# Patient Record
Sex: FEMALE | Race: WHITE | NOT HISPANIC OR LATINO | Employment: UNEMPLOYED | URBAN - METROPOLITAN AREA
[De-identification: names, ages, dates, MRNs, and addresses within clinical notes are randomized per-mention and may not be internally consistent; named-entity substitution may affect disease eponyms.]

---

## 2023-11-28 ENCOUNTER — HOSPITAL ENCOUNTER (OUTPATIENT)
Dept: GENERAL RADIOLOGY | Facility: HOSPITAL | Age: 53
Discharge: HOME | End: 2023-11-28
Admitting: INTERNAL MEDICINE
Payer: MEDICAID

## 2023-11-28 ENCOUNTER — OFFICE VISIT (OUTPATIENT)
Dept: INTERNAL MEDICINE | Facility: CLINIC | Age: 53
End: 2023-11-28
Payer: MEDICAID

## 2023-11-28 VITALS
SYSTOLIC BLOOD PRESSURE: 110 MMHG | OXYGEN SATURATION: 96 % | WEIGHT: 276.9 LBS | BODY MASS INDEX: 44.5 KG/M2 | HEART RATE: 74 BPM | HEIGHT: 66 IN | TEMPERATURE: 97.5 F | DIASTOLIC BLOOD PRESSURE: 76 MMHG

## 2023-11-28 DIAGNOSIS — M79.642 PAIN OF LEFT HAND: ICD-10-CM

## 2023-11-28 DIAGNOSIS — G89.4 CHRONIC PAIN SYNDROME: ICD-10-CM

## 2023-11-28 DIAGNOSIS — J44.89 ASTHMA-COPD OVERLAP SYNDROME: Primary | ICD-10-CM

## 2023-11-28 PROCEDURE — 1160F RVW MEDS BY RX/DR IN RCRD: CPT | Performed by: INTERNAL MEDICINE

## 2023-11-28 PROCEDURE — 1159F MED LIST DOCD IN RCRD: CPT | Performed by: INTERNAL MEDICINE

## 2023-11-28 PROCEDURE — 99204 OFFICE O/P NEW MOD 45 MIN: CPT | Performed by: INTERNAL MEDICINE

## 2023-11-28 PROCEDURE — 73130 X-RAY EXAM OF HAND: CPT

## 2023-11-28 RX ORDER — IBUPROFEN 800 MG/1
800 TABLET ORAL 3 TIMES DAILY
COMMUNITY
Start: 2023-08-01

## 2023-11-28 RX ORDER — LORATADINE 10 MG/1
10 TABLET ORAL DAILY
COMMUNITY

## 2023-11-28 RX ORDER — FAMOTIDINE 40 MG/1
40 TABLET, FILM COATED ORAL
COMMUNITY
Start: 2023-10-30 | End: 2024-01-28

## 2023-11-28 RX ORDER — FEXOFENADINE HCL 60 MG/1
180 TABLET, FILM COATED ORAL DAILY
COMMUNITY
Start: 2023-10-19 | End: 2024-01-17

## 2023-11-28 RX ORDER — KETOCONAZOLE 20 MG/ML
SHAMPOO TOPICAL
COMMUNITY
Start: 2023-10-04

## 2023-11-28 RX ORDER — GABAPENTIN 300 MG/1
1 CAPSULE ORAL EVERY EVENING
COMMUNITY
Start: 2023-08-24

## 2023-11-28 RX ORDER — FLUTICASONE PROPIONATE 50 MCG
1 SPRAY, SUSPENSION (ML) NASAL DAILY
COMMUNITY
Start: 2023-10-03

## 2023-11-28 RX ORDER — HYDROCHLOROTHIAZIDE 12.5 MG/1
12.5 CAPSULE, GELATIN COATED ORAL DAILY
COMMUNITY
Start: 2023-08-24

## 2023-11-28 RX ORDER — HYDROCODONE BITARTRATE AND ACETAMINOPHEN 10; 325 MG/1; MG/1
1 TABLET ORAL 2 TIMES DAILY
COMMUNITY
Start: 2023-11-13

## 2023-11-28 RX ORDER — METHOCARBAMOL 500 MG/1
500 TABLET, FILM COATED ORAL
COMMUNITY
Start: 2023-10-24

## 2023-11-28 RX ORDER — TRIAMCINOLONE ACETONIDE 55 UG/1
2 SPRAY, METERED NASAL DAILY
COMMUNITY

## 2023-11-28 RX ORDER — LEVOTHYROXINE SODIUM 0.07 MG/1
75 TABLET ORAL DAILY
COMMUNITY
Start: 2023-10-30 | End: 2024-01-28

## 2023-11-28 RX ORDER — CLONAZEPAM 1 MG/1
1 TABLET ORAL 2 TIMES DAILY
COMMUNITY
Start: 2023-11-03

## 2023-11-28 RX ORDER — TOPIRAMATE 100 MG/1
100 TABLET, FILM COATED ORAL
COMMUNITY
Start: 2023-10-30 | End: 2024-01-28

## 2023-11-28 RX ORDER — ALBUTEROL SULFATE 90 UG/1
AEROSOL, METERED RESPIRATORY (INHALATION)
COMMUNITY
Start: 2023-06-29

## 2023-11-28 RX ORDER — IPRATROPIUM BROMIDE AND ALBUTEROL SULFATE 2.5; .5 MG/3ML; MG/3ML
SOLUTION RESPIRATORY (INHALATION)
COMMUNITY
Start: 2023-10-04

## 2023-11-28 RX ORDER — FLUTICASONE PROPIONATE AND SALMETEROL 50; 250 UG/1; UG/1
POWDER RESPIRATORY (INHALATION)
COMMUNITY
Start: 2023-11-08

## 2023-11-28 RX ORDER — ERGOCALCIFEROL 1.25 MG/1
CAPSULE ORAL
COMMUNITY
Start: 2023-09-30

## 2023-11-28 NOTE — PROGRESS NOTES
"Chief Complaint  Establish Care, Anxiety, and Hand Injury (Left hand injured from an fall and also back injury)    Subjective        Nalini Nguyễn presents to [facility]  History of Present Illness    Ms. Nguyễn is a alma 51 yo F who presents to establish care and discuss recent hand injury, mental health challenges, mixed asthma/COPD, migraines, anxiety/depression, OA of spine, neuropathy.     History of chronic sinus infections as well.     Patient has UDS from prior PCP dated 5/9/23.  Will use this for her UDS for us.       Objective   Vital Signs:  /76 (BP Location: Right arm, Patient Position: Sitting, Cuff Size: Large Adult)   Pulse 74   Temp 97.5 °F (36.4 °C) (Infrared)   Ht 167.6 cm (66\")   Wt 126 kg (276 lb 14.4 oz)   SpO2 96%   BMI 44.69 kg/m²   Estimated body mass index is 44.69 kg/m² as calculated from the following:    Height as of this encounter: 167.6 cm (66\").    Weight as of this encounter: 126 kg (276 lb 14.4 oz).       Class 3 Severe Obesity (BMI >=40). Obesity-related health conditions include the following: none. Obesity is unchanged. BMI is is above average; BMI management plan is completed. We discussed portion control and increasing exercise.      Physical Exam  Vitals reviewed.   Constitutional:       General: She is not in acute distress.     Appearance: Normal appearance.   HENT:      Head: Normocephalic and atraumatic.      Nose: Nose normal.      Mouth/Throat:      Mouth: Mucous membranes are moist.   Eyes:      Conjunctiva/sclera: Conjunctivae normal.   Cardiovascular:      Rate and Rhythm: Normal rate and regular rhythm.      Pulses: Normal pulses.      Heart sounds: Normal heart sounds.   Pulmonary:      Effort: Pulmonary effort is normal.      Breath sounds: Normal breath sounds.   Abdominal:      Palpations: Abdomen is soft.      Tenderness: There is no abdominal tenderness.   Musculoskeletal:      Right lower leg: No edema.      Left " lower leg: No edema.   Skin:     General: Skin is warm and dry.   Neurological:      General: No focal deficit present.      Mental Status: She is alert.   Psychiatric:         Mood and Affect: Mood normal.        Result Review :  The following data was reviewed by: Mesha Nicholas MD on 11/28/2023:  Common labs          12/19/2022    12:41   Common Labs   WBC 7.6       Hemoglobin 14.1       Hematocrit 43.9       Platelets 244       Total Cholesterol 218       Triglycerides 126       HDL Cholesterol 50       LDL Cholesterol  145          Details          This result is from an external source.             Data reviewed : previous PCP Dr. Mahoney's notes reviewed             Assessment and Plan   Diagnoses and all orders for this visit:    1. Asthma-COPD overlap syndrome (Primary)    2. Pain of left hand  -     XR Hand 3+ View Left    3. Chronic pain syndrome      Reviewed prior paperwork from [place] for disability.  Reviewed prior doctor's notes and reviewed paperwork needed by [place] for her to continue benefits.  Signed paperwork today given history and previous document review availability.     Reviewed chronic conditions and discussed chronic pain regulations as well as my approach.  Has UDS from prior physician from 5/2023.  Will use this for compliance.          Follow Up   Return in about 6 months (around 5/28/2024) for f/u pain, anxiety, labs? .  Patient was given instructions and counseling regarding her condition or for health maintenance advice. Please see specific information pulled into the AVS if appropriate.

## 2023-12-04 PROBLEM — J44.89 ASTHMA-COPD OVERLAP SYNDROME: Status: ACTIVE | Noted: 2023-12-04

## 2023-12-04 PROBLEM — G89.4 CHRONIC PAIN SYNDROME: Status: ACTIVE | Noted: 2023-12-04

## 2024-01-08 ENCOUNTER — TELEPHONE (OUTPATIENT)
Dept: INTERNAL MEDICINE | Facility: CLINIC | Age: 54
End: 2024-01-08
Payer: MEDICAID

## 2024-01-08 NOTE — TELEPHONE ENCOUNTER
She was set up for a video visit but I changed to a office visit due to call transferred to me for short of breath.  I have advised Ms. Nguyễn to go to the ED but she said this happens ever time this year.  She is keeping the apt for tomorrow and does not think she needs to go to the Emergency Dept.  Patient was told several times to go to the ED.

## 2024-01-09 ENCOUNTER — OFFICE VISIT (OUTPATIENT)
Dept: INTERNAL MEDICINE | Facility: CLINIC | Age: 54
End: 2024-01-09
Payer: MEDICAID

## 2024-01-09 VITALS
DIASTOLIC BLOOD PRESSURE: 74 MMHG | TEMPERATURE: 97.7 F | WEIGHT: 274 LBS | BODY MASS INDEX: 44.03 KG/M2 | SYSTOLIC BLOOD PRESSURE: 124 MMHG | HEART RATE: 70 BPM | HEIGHT: 66 IN | OXYGEN SATURATION: 96 %

## 2024-01-09 DIAGNOSIS — J44.89 ASTHMA-COPD OVERLAP SYNDROME: Primary | ICD-10-CM

## 2024-01-09 DIAGNOSIS — J45.41 MODERATE PERSISTENT ASTHMA WITH EXACERBATION: ICD-10-CM

## 2024-01-09 PROCEDURE — 1159F MED LIST DOCD IN RCRD: CPT | Performed by: INTERNAL MEDICINE

## 2024-01-09 PROCEDURE — 99214 OFFICE O/P EST MOD 30 MIN: CPT | Performed by: INTERNAL MEDICINE

## 2024-01-09 PROCEDURE — 1160F RVW MEDS BY RX/DR IN RCRD: CPT | Performed by: INTERNAL MEDICINE

## 2024-01-09 RX ORDER — ALBUTEROL SULFATE 90 UG/1
2 AEROSOL, METERED RESPIRATORY (INHALATION) EVERY 6 HOURS PRN
Qty: 18 G | Refills: 6 | Status: SHIPPED | OUTPATIENT
Start: 2024-01-09

## 2024-01-09 RX ORDER — AZITHROMYCIN 250 MG/1
TABLET, FILM COATED ORAL
Qty: 6 TABLET | Refills: 0 | Status: SHIPPED | OUTPATIENT
Start: 2024-01-09

## 2024-01-09 RX ORDER — PREDNISONE 20 MG/1
TABLET ORAL
Qty: 8 TABLET | Refills: 0 | Status: SHIPPED | OUTPATIENT
Start: 2024-01-09 | End: 2024-01-16

## 2024-01-09 NOTE — PROGRESS NOTES
Nalini Nguyễn is a 53 y.o. female who presents with a chief complaint of   Chief Complaint   Patient presents with    Cough    Asthma     Has been taking mucinex but has not gone away. Symptoms for about 2 weeks.     Nasal Congestion    Shortness of Breath       HPI     Increased wheezing, SOA.  No fever, but has had some bloody nose.  Advair use, continue.  Use albuterol every 4-6 hours.       The following portions of the patient's history were reviewed and updated as appropriate: allergies, current medications, past family history, past medical history, past social history, past surgical history and problem list.      Current Outpatient Medications:     Advair Diskus 250-50 MCG/ACT DISKUS, USE 2 PUFFS BY MOUTH ONCE A DAY *MUST MAKE APPOINTMENT FOR FURTHER REFILLS*, Disp: , Rfl:     albuterol sulfate  (90 Base) MCG/ACT inhaler, Inhale 2 puffs Every 6 (Six) Hours As Needed for Wheezing or Shortness of Air., Disp: 18 g, Rfl: 6    clonazePAM (KlonoPIN) 1 MG tablet, Take 1 tablet by mouth 2 (Two) Times a Day., Disp: , Rfl:     famotidine (PEPCID) 40 MG tablet, Take 1 tablet by mouth., Disp: , Rfl:     fluticasone (FLONASE) 50 MCG/ACT nasal spray, 1 spray by Each Nare route Daily., Disp: , Rfl:     gabapentin (NEURONTIN) 300 MG capsule, Take 1 capsule by mouth Every Evening., Disp: , Rfl:     HYDROcodone-acetaminophen (NORCO)  MG per tablet, Take 1 tablet by mouth 2 (Two) Times a Day., Disp: , Rfl:     ibuprofen (ADVIL,MOTRIN) 800 MG tablet, Take 1 tablet by mouth 3 (Three) Times a Day., Disp: , Rfl:     ipratropium-albuterol (DUO-NEB) 0.5-2.5 mg/3 ml nebulizer, INHALE 1 VIAL BY MOUTH VIA NEBULIZER 4 TIMES DAILY AT 08:00AM, 12:00 NOON, 04:00PM, 08:00PM., Disp: , Rfl:     ketoconazole (NIZORAL) 2 % shampoo, APPLY TOPICALLY AS DIRECTED 3 TIMES A WEEK, Disp: , Rfl:     levothyroxine (SYNTHROID, LEVOTHROID) 75 MCG tablet, Take 1 tablet by mouth Daily., Disp: , Rfl:     loratadine (CLARITIN) 10 MG tablet,  "Take 1 tablet by mouth Daily., Disp: , Rfl:     methocarbamol (ROBAXIN) 500 MG tablet, Take 1 tablet by mouth., Disp: , Rfl:     topiramate (TOPAMAX) 100 MG tablet, Take 1 tablet by mouth., Disp: , Rfl:     Triamcinolone Acetonide (NASACORT) 55 MCG/ACT nasal inhaler, 2 sprays into the nostril(s) as directed by provider Daily., Disp: , Rfl:     vitamin D (ERGOCALCIFEROL) 1.25 MG (45513 UT) capsule capsule, , Disp: , Rfl:     azithromycin (Zithromax Z-Kirit) 250 MG tablet, Take 2 tablets the first day, then 1 tablet daily for 4 days., Disp: 6 tablet, Rfl: 0    fexofenadine (ALLEGRA) 60 MG tablet, Take 3 tablets by mouth Daily. (Patient not taking: Reported on 1/9/2024), Disp: , Rfl:     hydroCHLOROthiazide (MICROZIDE) 12.5 MG capsule, Take 1 capsule by mouth Daily., Disp: , Rfl:     predniSONE (DELTASONE) 20 MG tablet, Take 2 tablets by mouth Daily for 2 days, THEN 1 tablet Daily for 3 days, THEN 0.5 tablets Daily for 2 days., Disp: 8 tablet, Rfl: 0            Physical Exam  /74 (BP Location: Left arm, Patient Position: Sitting, Cuff Size: Large Adult)   Pulse 70   Temp 97.7 °F (36.5 °C) (Infrared)   Ht 167.6 cm (66\")   Wt 124 kg (274 lb)   SpO2 96%   BMI 44.22 kg/m²     Physical Exam  Vitals reviewed.   Constitutional:       General: She is not in acute distress.     Appearance: Normal appearance.   HENT:      Head: Normocephalic and atraumatic.      Nose: Nose normal.      Mouth/Throat:      Mouth: Mucous membranes are moist.      Pharynx: Posterior oropharyngeal erythema present.   Eyes:      Conjunctiva/sclera: Conjunctivae normal.   Cardiovascular:      Rate and Rhythm: Normal rate and regular rhythm.   Pulmonary:      Effort: Pulmonary effort is normal.      Breath sounds: Wheezing present.   Skin:     General: Skin is warm and dry.   Neurological:      General: No focal deficit present.      Mental Status: She is alert.   Psychiatric:         Mood and Affect: Mood normal.           No results found " for this or any previous visit.        Diagnoses and all orders for this visit:    1. Asthma-COPD overlap syndrome (Primary)  -     albuterol sulfate  (90 Base) MCG/ACT inhaler; Inhale 2 puffs Every 6 (Six) Hours As Needed for Wheezing or Shortness of Air.  Dispense: 18 g; Refill: 6  -     azithromycin (Zithromax Z-Kirit) 250 MG tablet; Take 2 tablets the first day, then 1 tablet daily for 4 days.  Dispense: 6 tablet; Refill: 0  -     predniSONE (DELTASONE) 20 MG tablet; Take 2 tablets by mouth Daily for 2 days, THEN 1 tablet Daily for 3 days, THEN 0.5 tablets Daily for 2 days.  Dispense: 8 tablet; Refill: 0    2. Moderate persistent asthma with exacerbation  -     albuterol sulfate  (90 Base) MCG/ACT inhaler; Inhale 2 puffs Every 6 (Six) Hours As Needed for Wheezing or Shortness of Air.  Dispense: 18 g; Refill: 6  -     azithromycin (Zithromax Z-Kirit) 250 MG tablet; Take 2 tablets the first day, then 1 tablet daily for 4 days.  Dispense: 6 tablet; Refill: 0  -     predniSONE (DELTASONE) 20 MG tablet; Take 2 tablets by mouth Daily for 2 days, THEN 1 tablet Daily for 3 days, THEN 0.5 tablets Daily for 2 days.  Dispense: 8 tablet; Refill: 0    Flare of asthma with notable wheezing.  Treat as such.    Declines mammo and pap smear today. Will discuss again at future visit.

## 2024-02-06 DIAGNOSIS — J44.89 ASTHMA-COPD OVERLAP SYNDROME: ICD-10-CM

## 2024-02-06 DIAGNOSIS — G89.4 CHRONIC PAIN SYNDROME: Primary | ICD-10-CM

## 2024-02-06 DIAGNOSIS — J45.41 MODERATE PERSISTENT ASTHMA WITH EXACERBATION: ICD-10-CM

## 2024-02-07 NOTE — TELEPHONE ENCOUNTER
Rx Refill Note  Requested Prescriptions     Pending Prescriptions Disp Refills    gabapentin (NEURONTIN) 300 MG capsule       Sig: Take 1 capsule by mouth Every Evening.    vitamin D (ERGOCALCIFEROL) 1.25 MG (44789 UT) capsule capsule 5 capsule     loratadine (CLARITIN) 10 MG tablet       Sig: Take 1 tablet by mouth Daily.    albuterol sulfate  (90 Base) MCG/ACT inhaler 18 g 6     Sig: Inhale 2 puffs Every 6 (Six) Hours As Needed for Wheezing or Shortness of Air.      Last office visit with prescribing clinician: 1/9/2024   Last telemedicine visit with prescribing clinician: Visit date not found   Next office visit with prescribing clinician: 5/31/2024                         Would you like a call back once the refill request has been completed: [] Yes [] No    If the office needs to give you a call back, can they leave a voicemail: [] Yes [] No    Jamilah Raphael MA  02/07/24, 13:27 EST

## 2024-02-09 RX ORDER — LORATADINE 10 MG/1
10 TABLET ORAL DAILY
Qty: 90 TABLET | Refills: 3 | Status: SHIPPED | OUTPATIENT
Start: 2024-02-09

## 2024-02-09 RX ORDER — GABAPENTIN 300 MG/1
300 CAPSULE ORAL EVERY EVENING
Qty: 90 CAPSULE | Refills: 0 | Status: SHIPPED | OUTPATIENT
Start: 2024-02-09

## 2024-02-09 RX ORDER — ALBUTEROL SULFATE 90 UG/1
2 AEROSOL, METERED RESPIRATORY (INHALATION) EVERY 6 HOURS PRN
Qty: 18 G | Refills: 6 | Status: SHIPPED | OUTPATIENT
Start: 2024-02-09

## 2024-02-09 RX ORDER — ERGOCALCIFEROL 1.25 MG/1
50000 CAPSULE ORAL
Qty: 12 CAPSULE | Refills: 0 | Status: SHIPPED | OUTPATIENT
Start: 2024-02-09

## 2024-05-13 ENCOUNTER — TELEMEDICINE (OUTPATIENT)
Dept: FAMILY MEDICINE CLINIC | Facility: TELEHEALTH | Age: 54
End: 2024-05-13
Payer: MEDICAID

## 2024-05-13 ENCOUNTER — TELEPHONE (OUTPATIENT)
Dept: INTERNAL MEDICINE | Facility: CLINIC | Age: 54
End: 2024-05-13
Payer: MEDICAID

## 2024-05-13 DIAGNOSIS — J40 BRONCHITIS: ICD-10-CM

## 2024-05-13 DIAGNOSIS — J01.00 ACUTE MAXILLARY SINUSITIS, RECURRENCE NOT SPECIFIED: Primary | ICD-10-CM

## 2024-05-13 PROCEDURE — 1159F MED LIST DOCD IN RCRD: CPT | Performed by: NURSE PRACTITIONER

## 2024-05-13 PROCEDURE — 99213 OFFICE O/P EST LOW 20 MIN: CPT | Performed by: NURSE PRACTITIONER

## 2024-05-13 PROCEDURE — 1160F RVW MEDS BY RX/DR IN RCRD: CPT | Performed by: NURSE PRACTITIONER

## 2024-05-13 RX ORDER — AMOXICILLIN AND CLAVULANATE POTASSIUM 875; 125 MG/1; MG/1
1 TABLET, FILM COATED ORAL 2 TIMES DAILY
Qty: 20 TABLET | Refills: 0 | Status: SHIPPED | OUTPATIENT
Start: 2024-05-13 | End: 2024-05-23

## 2024-05-13 RX ORDER — DEXTROMETHORPHAN HYDROBROMIDE AND PROMETHAZINE HYDROCHLORIDE 15; 6.25 MG/5ML; MG/5ML
5 SYRUP ORAL 4 TIMES DAILY PRN
Qty: 150 ML | Refills: 0 | Status: SHIPPED | OUTPATIENT
Start: 2024-05-13

## 2024-05-13 RX ORDER — PREDNISONE 10 MG/1
TABLET ORAL
Qty: 21 TABLET | Refills: 0 | Status: SHIPPED | OUTPATIENT
Start: 2024-05-13

## 2024-05-13 NOTE — PROGRESS NOTES
You have chosen to receive care through a telehealth visit.  Do you consent to use a video/audio connection for your medical care today? Yes     CHIEF COMPLAINT  No chief complaint on file.        HPI  Nalini Nguyễn is a 53 y.o. female  presents with complaint of 1 week history of nasal congestion with white bloody discharge, small amt of yellow/green sputum with cough, wheezing, shortness of breath, pressure in both ears, throat feels raw.  Denies fever.  Has inhaler at home for wheezing.     Review of Systems  See HPI    No past medical history on file.    No family history on file.    Social History     Socioeconomic History    Marital status:    Tobacco Use    Smoking status: Never     Passive exposure: Never    Smokeless tobacco: Never       Nalini Nguyễn  reports that she has never smoked. She has never been exposed to tobacco smoke. She has never used smokeless tobacco.              There were no vitals taken for this visit.    PHYSICAL EXAM  Physical Exam   Constitutional: She is oriented to person, place, and time. She appears well-developed and well-nourished. She does not have a sickly appearance. She does not appear ill.   HENT:   Head: Normocephalic and atraumatic.   Pulmonary/Chest: Effort normal.  No respiratory distress (persistent cough, audible wheezing).  Neurological: She is alert and oriented to person, place, and time.       No results found for this or any previous visit.    Diagnoses and all orders for this visit:    1. Acute maxillary sinusitis, recurrence not specified (Primary)  -     amoxicillin-clavulanate (AUGMENTIN) 875-125 MG per tablet; Take 1 tablet by mouth 2 (Two) Times a Day for 10 days.  Dispense: 20 tablet; Refill: 0    2. Bronchitis  -     predniSONE (DELTASONE) 10 MG (21) dose pack; Use as directed on package  Dispense: 21 tablet; Refill: 0  -     promethazine-dextromethorphan (PROMETHAZINE-DM) 6.25-15 MG/5ML syrup; Take 5 mL by mouth 4 (Four) Times a Day As Needed for  Cough.  Dispense: 150 mL; Refill: 0    --take medications as prescribed  --increase fluids, rest as needed, tylenol or ibuprofen for pain  --f/u in 5-7 days if no improvement        FOLLOW-UP  As discussed during visit with PCP/[facility] if no improvement or Urgent Care/Emergency Department if worsening of symptoms    Patient verbalizes understanding of medication dosage, comfort measures, instructions for treatment and follow-up.    Yandy Huang, APRN  05/13/2024  12:56 EDT    The use of a video visit has been reviewed with the patient and verbal informed consent has been obtained. Myself and Nalini Nguyễn participated in this visit. The patient is located in [address] [place] [address].    I am located in [place], [place]. [employer] and [employer] were utilized. I spent 8 minutes in the patient's chart for this visit.      Note Disclaimer: At [facility], we believe that sharing information builds trust and better   relationships. You are receiving this note because you recently visited [facility]. It is possible you   will see health information before a provider has talked with you about it. This kind of information can   be easy to misunderstand. To help you fully understand what it means for your health, we urge you to   discuss this note with your provider.

## 2024-05-13 NOTE — TELEPHONE ENCOUNTER
Hub staff attempted to follow warm transfer process and was unsuccessful     Caller: Nalini Nguyễn    Relationship to patient: Self    Best call back number: [phone]     Patient is needing: TO SCHEDULE APPOINTMENT WITH DR CLARK FOR UPPER RESPIRATORY INFECTION, SHE HAS ASTHMA AND IS NEEDING TO GET ON AN ANTIBIOTIC TO BREAK THIS UP.     PLEASE CALL TO SCHEDULE SOMETHING FOR HER ASAP. SHE WILL ONLY SEE DR CLARK.

## 2024-05-13 NOTE — PATIENT INSTRUCTIONS
Sinus Infection, Adult  A sinus infection, also called sinusitis, is inflammation of your sinuses. Sinuses are hollow spaces in the bones around your face. Your sinuses are located:  Around your eyes.  In the middle of your forehead.  Behind your nose.  In your cheekbones.  Mucus normally drains out of your sinuses. When your nasal tissues become inflamed or swollen, mucus can become trapped or blocked. This allows bacteria, viruses, and fungi to grow, which leads to infection. Most infections of the sinuses are caused by a virus.  A sinus infection can develop quickly. It can last for up to 4 weeks (acute) or for more than 12 weeks (chronic). A sinus infection often develops after a cold.  What are the causes?  This condition is caused by anything that creates swelling in the sinuses or stops mucus from draining. This includes:  Allergies.  Asthma.  Infection from bacteria or viruses.  Deformities or blockages in your nose or sinuses.  Abnormal growths in the nose (nasal polyps).  Pollutants, such as chemicals or irritants in the air.  Infection from fungi. This is rare.  What increases the risk?  You are more likely to develop this condition if you:  Have a weak body defense system (immune system).  Do a lot of swimming or diving.  Overuse nasal sprays.  Smoke.  What are the signs or symptoms?  The main symptoms of this condition are pain and a feeling of pressure around the affected sinuses. Other symptoms include:  Stuffy nose or congestion that makes it difficult to breathe through your nose.  Thick yellow or greenish drainage from your nose.  Tenderness, swelling, and warmth over the affected sinuses.  A cough that may get worse at night.  Decreased sense of smell and taste.  Extra mucus that collects in the throat or the back of the nose (postnasal drip) causing a sore throat or bad breath.  Tiredness (fatigue).  Fever.  How is this diagnosed?  This condition is diagnosed based on:  Your symptoms.  Your  medical history.  A physical exam.  Tests to find out if your condition is acute or chronic. This may include:  Checking your nose for nasal polyps.  Viewing your sinuses using a device that has a light (endoscope).  Testing for allergies or bacteria.  Imaging tests, such as an MRI or CT scan.  In rare cases, a bone biopsy may be done to rule out more serious types of fungal sinus disease.  How is this treated?  Treatment for a sinus infection depends on the cause and whether your condition is chronic or acute.  If caused by a virus, your symptoms should go away on their own within 10 days. You may be given medicines to relieve symptoms. They include:  Medicines that shrink swollen nasal passages (decongestants).  A spray that eases inflammation of the nostrils (topical intranasal corticosteroids).  Rinses that help get rid of thick mucus in your nose (nasal saline washes).  Medicines that treat allergies (antihistamines).  Over-the-counter pain relievers.  If caused by bacteria, your health care provider may recommend waiting to see if your symptoms improve. Most bacterial infections will get better without antibiotic medicine. You may be given antibiotics if you have:  A severe infection.  A weak immune system.  If caused by narrow nasal passages or nasal polyps, surgery may be needed.  Follow these instructions at home:  Medicines  Take, use, or apply over-the-counter and prescription medicines only as told by your health care provider. These may include nasal sprays.  If you were prescribed an antibiotic medicine, take it as told by your health care provider. Do not stop taking the antibiotic even if you start to feel better.  Hydrate and humidify    Drink enough fluid to keep your urine pale yellow. Staying hydrated will help to thin your mucus.  Use a cool mist humidifier to keep the humidity level in your home above 50%.  Inhale steam for 10-15 minutes, 3-4 times a day, or as told by your health care  provider. You can do this in the bathroom while a hot shower is running.  Limit your exposure to cool or dry air.  Rest  Rest as much as possible.  Sleep with your head raised (elevated).  Make sure you get enough sleep each night.  General instructions    Apply a warm, moist washcloth to your face 3-4 times a day or as told by your health care provider. This will help with discomfort.  Use nasal saline washes as often as told by your health care provider.  Wash your hands often with soap and water to reduce your exposure to germs. If soap and water are not available, use hand .  Do not smoke. Avoid being around people who are smoking (secondhand smoke).  Keep all follow-up visits. This is important.  Contact a health care provider if:  You have a fever.  Your symptoms get worse.  Your symptoms do not improve within 10 days.  Get help right away if:  You have a severe headache.  You have persistent vomiting.  You have severe pain or swelling around your face or eyes.  You have vision problems.  You develop confusion.  Your neck is stiff.  You have trouble breathing.  These symptoms may be an emergency. Get help right away. Call 911.  Do not wait to see if the symptoms will go away.  Do not drive yourself to the hospital.  Summary  A sinus infection is soreness and inflammation of your sinuses. Sinuses are hollow spaces in the bones around your face.  This condition is caused by nasal tissues that become inflamed or swollen. The swelling traps or blocks the flow of mucus. This allows bacteria, viruses, and fungi to grow, which leads to infection.  If you were prescribed an antibiotic medicine, take it as told by your health care provider. Do not stop taking the antibiotic even if you start to feel better.  Keep all follow-up visits. This is important.  This information is not intended to replace advice given to you by your health care provider. Make sure you discuss any questions you have with your health  care provider.  Document Revised: 11/22/2022 Document Reviewed: 11/22/2022  [employer] Patient Education © 2024 [employer]  Acute Bronchitis, Adult    Acute bronchitis is sudden inflammation of the main airways (bronchi) that come off the windpipe (trachea) in the lungs. The swelling causes the airways to get smaller and make more mucus than normal. This can make it hard to breathe and can cause coughing or noisy breathing (wheezing).  Acute bronchitis may last several weeks. The cough may last longer. Allergies, asthma, and exposure to smoke may make the condition worse.  What are the causes?  This condition can be caused by germs and by substances that irritate the lungs, including:  Cold and flu viruses. The most common cause of this condition is the virus that causes the common cold.  Bacteria. This is less common.  Breathing in substances that irritate the lungs, including:  Smoke from cigarettes and other forms of tobacco.  Dust and pollen.  Fumes from household cleaning products, gases, or burned fuel.  Indoor or outdoor air pollution.  What increases the risk?  The following factors may make you more likely to develop this condition:  A weak body's defense system, also called the immune system.  A condition that affects your lungs and breathing, such as asthma.  What are the signs or symptoms?  Common symptoms of this condition include:  Coughing. This may bring up clear, yellow, or green mucus from your lungs (sputum).  Wheezing.  Runny or stuffy nose.  Having too much mucus in your lungs (chest congestion).  Shortness of breath.  Aches and pains, including sore throat or chest.  How is this diagnosed?  This condition is usually diagnosed based on:  Your symptoms and medical history.  A physical exam.  You may also have other tests, including tests to rule out other conditions, such as pneumonia. These tests include:  A test of lung function.  Test of a mucus sample to look for the presence of  bacteria.  Tests to check the oxygen level in your blood.  Blood tests.  Chest X-ray.  How is this treated?  Most cases of acute bronchitis clear up over time without treatment. Your health care provider may recommend:  Drinking more fluids to help thin your mucus so it is easier to cough up.  Taking inhaled medicine (inhaler) to improve air flow in and out of your lungs.  Using a vaporizer or a humidifier. These are machines that add water to the air to help you breathe better.  Taking a medicine that thins mucus and clears congestion (expectorant).  Taking a medicine that prevents or stops coughing (cough suppressant).  It is not common to take an antibiotic medicine for this condition.  Follow these instructions at home:    Take over-the-counter and prescription medicines only as told by your health care provider.  Use an inhaler, vaporizer, or humidifier as told by your health care provider.  Take two teaspoons (10 mL) of honey at bedtime to lessen coughing at night.  Drink enough fluid to keep your urine pale yellow.  Do not use any products that contain nicotine or tobacco. These products include cigarettes, chewing tobacco, and vaping devices, such as e-cigarettes. If you need help quitting, ask your health care provider.  Get plenty of rest.  Return to your normal activities as told by your health care provider. Ask your health care provider what activities are safe for you.  Keep all follow-up visits. This is important.  How is this prevented?  To lower your risk of getting this condition again:  Wash your hands often with soap and water for at least 20 seconds. If soap and water are not available, use hand .  Avoid contact with people who have cold symptoms.  Try not to touch your mouth, nose, or eyes with your hands.  Avoid breathing in smoke or chemical fumes. Breathing smoke or chemical fumes will make your condition worse.  Get the flu shot every year.  Contact a health care provider if:  Your  symptoms do not improve after 2 weeks.  You have trouble coughing up the mucus.  Your cough keeps you awake at night.  You have a fever.  Get help right away if you:  Cough up blood.  Feel pain in your chest.  Have severe shortness of breath.  Faint or keep feeling like you are going to faint.  Have a severe headache.  Have a fever or chills that get worse.  These symptoms may represent a serious problem that is an emergency. Do not wait to see if the symptoms will go away. Get medical help right away. Call your local emergency services (911 in the [place] Do not drive yourself to the hospital.  Summary  Acute bronchitis is inflammation of the main airways (bronchi) that come off the windpipe (trachea) in the lungs. The swelling causes the airways to get smaller and make more mucus than normal.  Drinking more fluids can help thin your mucus so it is easier to cough up.  Take over-the-counter and prescription medicines only as told by your health care provider.  Do not use any products that contain nicotine or tobacco. These products include cigarettes, chewing tobacco, and vaping devices, such as e-cigarettes. If you need help quitting, ask your health care provider.  Contact a health care provider if your symptoms do not improve after 2 weeks.  This information is not intended to replace advice given to you by your health care provider. Make sure you discuss any questions you have with your health care provider.  Document Revised: 03/30/2023 Document Reviewed: 04/20/2022  [employer] Patient Education © 2024 [employer]

## 2024-05-15 ENCOUNTER — TELEPHONE (OUTPATIENT)
Dept: INTERNAL MEDICINE | Facility: CLINIC | Age: 54
End: 2024-05-15
Payer: MEDICAID

## 2024-05-18 DIAGNOSIS — J45.41 MODERATE PERSISTENT ASTHMA WITH EXACERBATION: ICD-10-CM

## 2024-05-18 DIAGNOSIS — J44.89 ASTHMA-COPD OVERLAP SYNDROME: ICD-10-CM

## 2024-05-19 NOTE — TELEPHONE ENCOUNTER
Rx Refill Note  Requested Prescriptions     Pending Prescriptions Disp Refills    ketoconazole (NIZORAL) 2 % shampoo      methocarbamol (ROBAXIN) 500 MG tablet       Sig: Take 1 tablet by mouth.    fexofenadine (ALLEGRA) 60 MG tablet 90 tablet 2     Sig: Take 3 tablets by mouth Daily for 90 days.    Advair Diskus 250-50 MCG/ACT DISKUS      Triamcinolone Acetonide (NASACORT) 55 MCG/ACT nasal inhaler 16.5 g      Si sprays into the nostril(s) as directed by provider Daily.    albuterol sulfate  (90 Base) MCG/ACT inhaler 18 g 6     Sig: Inhale 2 puffs Every 6 (Six) Hours As Needed for Wheezing or Shortness of Air.      Last office visit with prescribing clinician: 2024   Last telemedicine visit with prescribing clinician: Visit date not found   Next office visit with prescribing clinician: 2024                         Would you like a call back once the refill request has been completed: [] Yes [] No    If the office needs to give you a call back, can they leave a voicemail: [] Yes [] No    Riddhi Montelongo MA  24, 09:29 EDT

## 2024-05-21 RX ORDER — ALBUTEROL SULFATE 90 UG/1
2 AEROSOL, METERED RESPIRATORY (INHALATION) EVERY 6 HOURS PRN
Qty: 18 G | Refills: 6 | Status: SHIPPED | OUTPATIENT
Start: 2024-05-21

## 2024-05-21 RX ORDER — KETOCONAZOLE 20 MG/ML
SHAMPOO TOPICAL 2 TIMES WEEKLY
Qty: 120 ML | Refills: 1 | Status: SHIPPED | OUTPATIENT
Start: 2024-05-23

## 2024-05-21 RX ORDER — METHOCARBAMOL 500 MG/1
500 TABLET, FILM COATED ORAL 3 TIMES DAILY PRN
Qty: 90 TABLET | Refills: 3 | Status: SHIPPED | OUTPATIENT
Start: 2024-05-21

## 2024-05-21 RX ORDER — FEXOFENADINE HCL 60 MG/1
180 TABLET, FILM COATED ORAL DAILY
Qty: 90 TABLET | Refills: 2 | Status: SHIPPED | OUTPATIENT
Start: 2024-05-21 | End: 2024-08-19

## 2024-05-21 RX ORDER — TRIAMCINOLONE ACETONIDE 55 UG/1
2 SPRAY, METERED NASAL DAILY
Qty: 16.5 G | Refills: 6 | Status: SHIPPED | OUTPATIENT
Start: 2024-05-21

## 2024-05-21 RX ORDER — FLUTICASONE PROPIONATE AND SALMETEROL 50; 250 UG/1; UG/1
1 POWDER RESPIRATORY (INHALATION)
Qty: 60 EACH | Refills: 6 | Status: SHIPPED | OUTPATIENT
Start: 2024-05-21

## 2024-05-23 DIAGNOSIS — J44.89 ASTHMA-COPD OVERLAP SYNDROME: ICD-10-CM

## 2024-05-23 DIAGNOSIS — Z13.29 SCREENING FOR HYPOTHYROIDISM: ICD-10-CM

## 2024-05-23 DIAGNOSIS — Z13.1 SCREENING FOR DIABETES MELLITUS: ICD-10-CM

## 2024-05-23 DIAGNOSIS — E66.9 OBESITY WITHOUT SERIOUS COMORBIDITY, UNSPECIFIED CLASSIFICATION, UNSPECIFIED OBESITY TYPE: ICD-10-CM

## 2024-05-23 DIAGNOSIS — G89.4 CHRONIC PAIN SYNDROME: ICD-10-CM

## 2024-05-23 DIAGNOSIS — Z13.220 SCREENING FOR HYPERLIPIDEMIA: ICD-10-CM

## 2024-05-25 LAB
ALBUMIN SERPL-MCNC: 3.9 G/DL (ref 3.5–5.2)
ALBUMIN/GLOB SERPL: 1.8 G/DL
ALP SERPL-CCNC: 152 U/L (ref 39–117)
ALT SERPL-CCNC: 88 U/L (ref 1–33)
AST SERPL-CCNC: 30 U/L (ref 1–32)
BASOPHILS # BLD AUTO: 0.06 10*3/MM3 (ref 0–0.2)
BASOPHILS NFR BLD AUTO: 0.8 % (ref 0–1.5)
BILIRUB SERPL-MCNC: 0.4 MG/DL (ref 0–1.2)
BUN SERPL-MCNC: 14 MG/DL (ref 6–20)
BUN/CREAT SERPL: 18.2 (ref 7–25)
CALCIUM SERPL-MCNC: 9.3 MG/DL (ref 8.6–10.5)
CHLORIDE SERPL-SCNC: 105 MMOL/L (ref 98–107)
CHOLEST SERPL-MCNC: 216 MG/DL (ref 0–200)
CO2 SERPL-SCNC: 26.1 MMOL/L (ref 22–29)
CREAT SERPL-MCNC: 0.77 MG/DL (ref 0.57–1)
EGFRCR SERPLBLD CKD-EPI 2021: 92.4 ML/MIN/1.73
EOSINOPHIL # BLD AUTO: 0.65 10*3/MM3 (ref 0–0.4)
EOSINOPHIL NFR BLD AUTO: 8.9 % (ref 0.3–6.2)
ERYTHROCYTE [DISTWIDTH] IN BLOOD BY AUTOMATED COUNT: 12.7 % (ref 12.3–15.4)
GLOBULIN SER CALC-MCNC: 2.2 GM/DL
GLUCOSE SERPL-MCNC: 206 MG/DL (ref 65–99)
HBA1C MFR BLD: 7.4 % (ref 4.8–5.6)
HCT VFR BLD AUTO: 45.4 % (ref 34–46.6)
HDLC SERPL-MCNC: 48 MG/DL (ref 40–60)
HGB BLD-MCNC: 14.7 G/DL (ref 12–15.9)
IMM GRANULOCYTES # BLD AUTO: 0.04 10*3/MM3 (ref 0–0.05)
IMM GRANULOCYTES NFR BLD AUTO: 0.5 % (ref 0–0.5)
LDLC SERPL CALC-MCNC: 137 MG/DL (ref 0–100)
LDLC/HDLC SERPL: 2.78 {RATIO}
LYMPHOCYTES # BLD AUTO: 1.92 10*3/MM3 (ref 0.7–3.1)
LYMPHOCYTES NFR BLD AUTO: 26.3 % (ref 19.6–45.3)
MCH RBC QN AUTO: 29 PG (ref 26.6–33)
MCHC RBC AUTO-ENTMCNC: 32.4 G/DL (ref 31.5–35.7)
MCV RBC AUTO: 89.5 FL (ref 79–97)
MONOCYTES # BLD AUTO: 0.59 10*3/MM3 (ref 0.1–0.9)
MONOCYTES NFR BLD AUTO: 8.1 % (ref 5–12)
NEUTROPHILS # BLD AUTO: 4.05 10*3/MM3 (ref 1.7–7)
NEUTROPHILS NFR BLD AUTO: 55.4 % (ref 42.7–76)
NRBC BLD AUTO-RTO: 0 /100 WBC (ref 0–0.2)
PLATELET # BLD AUTO: 219 10*3/MM3 (ref 140–450)
POTASSIUM SERPL-SCNC: 4.6 MMOL/L (ref 3.5–5.2)
PROT SERPL-MCNC: 6.1 G/DL (ref 6–8.5)
RBC # BLD AUTO: 5.07 10*6/MM3 (ref 3.77–5.28)
SODIUM SERPL-SCNC: 141 MMOL/L (ref 136–145)
T4 FREE SERPL-MCNC: 1.03 NG/DL (ref 0.93–1.7)
TRIGL SERPL-MCNC: 172 MG/DL (ref 0–150)
TSH SERPL DL<=0.005 MIU/L-ACNC: 4.81 UIU/ML (ref 0.27–4.2)
VLDLC SERPL CALC-MCNC: 31 MG/DL (ref 5–40)
WBC # BLD AUTO: 7.31 10*3/MM3 (ref 3.4–10.8)

## 2024-05-31 ENCOUNTER — OFFICE VISIT (OUTPATIENT)
Dept: INTERNAL MEDICINE | Facility: CLINIC | Age: 54
End: 2024-05-31
Payer: MEDICAID

## 2024-05-31 VITALS
HEIGHT: 66 IN | WEIGHT: 276.6 LBS | DIASTOLIC BLOOD PRESSURE: 68 MMHG | SYSTOLIC BLOOD PRESSURE: 104 MMHG | BODY MASS INDEX: 44.45 KG/M2 | HEART RATE: 76 BPM | TEMPERATURE: 97.7 F | OXYGEN SATURATION: 98 %

## 2024-05-31 DIAGNOSIS — E11.65 TYPE 2 DIABETES MELLITUS WITH HYPERGLYCEMIA, WITHOUT LONG-TERM CURRENT USE OF INSULIN: Primary | ICD-10-CM

## 2024-05-31 DIAGNOSIS — G89.4 CHRONIC PAIN SYNDROME: ICD-10-CM

## 2024-05-31 DIAGNOSIS — E78.2 MIXED HYPERLIPIDEMIA: ICD-10-CM

## 2024-05-31 PROCEDURE — 3051F HG A1C>EQUAL 7.0%<8.0%: CPT | Performed by: INTERNAL MEDICINE

## 2024-05-31 PROCEDURE — 1160F RVW MEDS BY RX/DR IN RCRD: CPT | Performed by: INTERNAL MEDICINE

## 2024-05-31 PROCEDURE — 1159F MED LIST DOCD IN RCRD: CPT | Performed by: INTERNAL MEDICINE

## 2024-05-31 PROCEDURE — 99214 OFFICE O/P EST MOD 30 MIN: CPT | Performed by: INTERNAL MEDICINE

## 2024-05-31 RX ORDER — METFORMIN HYDROCHLORIDE 500 MG/1
TABLET, EXTENDED RELEASE ORAL
Qty: 100 TABLET | Refills: 1 | Status: SHIPPED | OUTPATIENT
Start: 2024-05-31

## 2024-05-31 RX ORDER — TRIAMCINOLONE ACETONIDE 5 MG/G
1 OINTMENT TOPICAL 2 TIMES DAILY
COMMUNITY

## 2024-05-31 NOTE — PROGRESS NOTES
"Chief Complaint  Anxiety (6 mo f/u)    Subjective        Nalini Nguyễn presents to [facility]  History of Present Illness    Does feel like she has a lingering cough hanging on from this recent infection.     Objective   Vital Signs:  /68 (BP Location: Left arm, Patient Position: Sitting, Cuff Size: Large Adult)   Pulse 76   Temp 97.7 °F (36.5 °C) (Infrared)   Ht 167.6 cm (66\")   Wt 125 kg (276 lb 9.6 oz)   SpO2 98%   BMI 44.64 kg/m²   Estimated body mass index is 44.64 kg/m² as calculated from the following:    Height as of this encounter: 167.6 cm (66\").    Weight as of this encounter: 125 kg (276 lb 9.6 oz).               Physical Exam  Vitals reviewed.   Constitutional:       General: She is not in acute distress.     Appearance: Normal appearance.   HENT:      Head: Normocephalic and atraumatic.      Nose: Nose normal.      Mouth/Throat:      Mouth: Mucous membranes are moist.   Eyes:      Conjunctiva/sclera: Conjunctivae normal.   Cardiovascular:      Rate and Rhythm: Normal rate and regular rhythm.   Pulmonary:      Effort: Pulmonary effort is normal.      Breath sounds: Normal breath sounds.   Skin:     General: Skin is warm and dry.   Neurological:      General: No focal deficit present.      Mental Status: She is alert.   Psychiatric:         Mood and Affect: Mood normal.        Result Review :    The following data was reviewed by: Mesha Nicholas MD on 05/31/2024:  Common labs          5/24/2024    08:47   Common Labs   Glucose 206    BUN 14    Creatinine 0.77    Sodium 141    Potassium 4.6    Chloride 105    Calcium 9.3    Total Protein 6.1    Albumin 3.9    Total Bilirubin 0.4    Alkaline Phosphatase 152    AST (SGOT) 30    ALT (SGPT) 88    WBC 7.31    Hemoglobin 14.7    Hematocrit 45.4    Platelets 219    Total Cholesterol 216    Triglycerides 172    HDL Cholesterol 48    LDL Cholesterol  137    Hemoglobin A1C 7.40      Data reviewed : [facility] " regarding sinus infection             Assessment and Plan     Diagnoses and all orders for this visit:    1. Type 2 diabetes mellitus with hyperglycemia, without long-term current use of insulin (Primary)  -     Ambulatory Referral to Diabetic Education  -     metFORMIN ER (GLUCOPHAGE-XR) 500 MG 24 hr tablet; If you are doing well with one daily, then increase to one pill in the morning and one at night.  Dispense: 100 tablet; Refill: 1    2. Chronic pain syndrome    3. Mixed hyperlipidemia      New diagnosis of diabetes today. Good conversation around this today.  Referred for diabetes education class and start metformin and f/u closely.     Discussed cholesterol as well.  Will re-visit this and thyroid next visit.          Follow Up     Return in about 3 months (around 8/31/2024) for Annual physical.  Patient was given instructions and counseling regarding her condition or for health maintenance advice. Please see specific information pulled into the AVS if appropriate.

## 2024-07-08 ENCOUNTER — PATIENT MESSAGE (OUTPATIENT)
Dept: INTERNAL MEDICINE | Facility: CLINIC | Age: 54
End: 2024-07-08
Payer: MEDICAID

## 2024-07-08 DIAGNOSIS — E11.65 TYPE 2 DIABETES MELLITUS WITH HYPERGLYCEMIA, WITHOUT LONG-TERM CURRENT USE OF INSULIN: Primary | ICD-10-CM

## 2024-07-11 NOTE — TELEPHONE ENCOUNTER
From: Nalini Nguyễn  To: Mesha Nicholas  Sent: 7/8/2024 1:23 PM EDT  Subject: Diabetic meter    Hi  just got off the phone with [employer] for the diabetic class and she suggested i send you a message, I've not received a rx for s diabetic meter, i think i need it for the class as well on 7/24/2024 , so please send a rx in for me thanks

## 2024-07-15 ENCOUNTER — TELEPHONE (OUTPATIENT)
Dept: INTERNAL MEDICINE | Facility: CLINIC | Age: 54
End: 2024-07-15

## 2024-07-15 RX ORDER — LANCETS 23 GAUGE
EACH MISCELLANEOUS
Qty: 200 EACH | Refills: 12 | Status: SHIPPED | OUTPATIENT
Start: 2024-07-15

## 2024-07-15 NOTE — TELEPHONE ENCOUNTER
Caller: Nalini Nguyễn    Relationship: Self    Best call back number: [phone]    What medication are you requesting: LANCETS AND TEST STRIPS    What are your current symptoms: DIABETES    Have you had these symptoms before:    [x] Yes  [] No    Have you been treated for these symptoms before:   [x] Yes  [] No    If a prescription is needed, what is your preferred pharmacy and phone number: [facility] [place] [address] [address] - [phone]  - [phone] FX     Additional notes: STATED THAT THEY HAD REACHED OUT TO THE PHARMACY AND WERE INFORMED THAT THEY HAVE THE PRESCRIPTION FOR THE GLUCOMETER BUT DO NOT HAVE ONE FOR THE LANCETS AND TEST STRIPS SO THEY ARE NEEDING TO GET THIS SENT IN. STATED THAT THEY NEED TO HAVE THIS ALL FOR THEIR DIABETES EDUCATION CLASS IN A COUPLE OF DAYS. PLEASE CALL AND ADVISE

## 2024-08-21 ENCOUNTER — PATIENT MESSAGE (OUTPATIENT)
Dept: INTERNAL MEDICINE | Facility: CLINIC | Age: 54
End: 2024-08-21
Payer: MEDICAID

## 2024-08-21 DIAGNOSIS — J44.89 ASTHMA-COPD OVERLAP SYNDROME: Primary | ICD-10-CM

## 2024-08-21 RX ORDER — GUAIFENESIN 200 MG/10ML
200 LIQUID ORAL 3 TIMES DAILY PRN
Qty: 180 ML | Refills: 0 | Status: SHIPPED | OUTPATIENT
Start: 2024-08-21

## 2024-08-21 RX ORDER — AZITHROMYCIN 250 MG/1
TABLET, FILM COATED ORAL
Qty: 6 TABLET | Refills: 0 | Status: SHIPPED | OUTPATIENT
Start: 2024-08-21

## 2024-08-21 RX ORDER — PREDNISONE 10 MG/1
TABLET ORAL
Qty: 7 TABLET | Refills: 0 | Status: SHIPPED | OUTPATIENT
Start: 2024-08-21 | End: 2024-08-27

## 2024-08-21 NOTE — TELEPHONE ENCOUNTER
From: Nalini Nguyễn  To: Mesha Nicholas  Sent: 8/21/2024 9:49 AM EDT  Subject: chest congestion    I've had chest & nasal congestion for about a week & cough, as per my history with sinus and chest congestion, please send rx in for antibiotics, steroids, cough syrup, Thank You Doctor Nicholas for your time helping me. Hope you have a blessed day.

## 2024-10-04 ENCOUNTER — OFFICE VISIT (OUTPATIENT)
Dept: INTERNAL MEDICINE | Facility: CLINIC | Age: 54
End: 2024-10-04
Payer: MEDICAID

## 2024-10-04 VITALS
BODY MASS INDEX: 43.58 KG/M2 | OXYGEN SATURATION: 96 % | TEMPERATURE: 98 F | HEART RATE: 72 BPM | SYSTOLIC BLOOD PRESSURE: 120 MMHG | DIASTOLIC BLOOD PRESSURE: 80 MMHG | WEIGHT: 270 LBS

## 2024-10-04 DIAGNOSIS — Z00.00 ANNUAL PHYSICAL EXAM: Primary | ICD-10-CM

## 2024-10-04 DIAGNOSIS — Z12.11 COLON CANCER SCREENING: ICD-10-CM

## 2024-10-04 DIAGNOSIS — K64.9 HEMORRHOIDS, UNSPECIFIED HEMORRHOID TYPE: ICD-10-CM

## 2024-10-04 DIAGNOSIS — J44.89 ASTHMA-COPD OVERLAP SYNDROME: ICD-10-CM

## 2024-10-04 DIAGNOSIS — E78.2 MIXED HYPERLIPIDEMIA: ICD-10-CM

## 2024-10-04 DIAGNOSIS — E11.65 TYPE 2 DIABETES MELLITUS WITH HYPERGLYCEMIA, WITHOUT LONG-TERM CURRENT USE OF INSULIN: ICD-10-CM

## 2024-10-04 DIAGNOSIS — J45.31 MILD PERSISTENT ASTHMA WITH EXACERBATION: ICD-10-CM

## 2024-10-04 PROCEDURE — 99396 PREV VISIT EST AGE 40-64: CPT | Performed by: INTERNAL MEDICINE

## 2024-10-04 PROCEDURE — 3051F HG A1C>EQUAL 7.0%<8.0%: CPT | Performed by: INTERNAL MEDICINE

## 2024-10-04 PROCEDURE — 1159F MED LIST DOCD IN RCRD: CPT | Performed by: INTERNAL MEDICINE

## 2024-10-04 PROCEDURE — 1160F RVW MEDS BY RX/DR IN RCRD: CPT | Performed by: INTERNAL MEDICINE

## 2024-10-04 RX ORDER — AZITHROMYCIN 250 MG/1
TABLET, FILM COATED ORAL
Qty: 8 TABLET | Refills: 0 | Status: SHIPPED | OUTPATIENT
Start: 2024-10-04

## 2024-10-04 RX ORDER — SODIUM CHLORIDE/SODIUM BICARB
PACKET (EA) NASAL
Qty: 70 EACH | Refills: 3 | Status: SHIPPED | OUTPATIENT
Start: 2024-10-04

## 2024-10-04 RX ORDER — DEXTROMETHORPHAN HYDROBROMIDE AND PROMETHAZINE HYDROCHLORIDE 15; 6.25 MG/5ML; MG/5ML
5 SYRUP ORAL NIGHTLY PRN
Qty: 180 ML | Refills: 2 | Status: SHIPPED | OUTPATIENT
Start: 2024-10-04

## 2024-10-04 RX ORDER — HYDROCORTISONE 10 MG/G
CREAM TOPICAL DAILY
Qty: 26 G | Refills: 2 | Status: SHIPPED | OUTPATIENT
Start: 2024-10-04

## 2024-10-04 RX ORDER — PREDNISONE 10 MG/1
TABLET ORAL
Qty: 11 TABLET | Refills: 0 | Status: SHIPPED | OUTPATIENT
Start: 2024-10-04 | End: 2024-10-14

## 2024-10-04 NOTE — PROGRESS NOTES
"Chief Complaint  Breathing Problem (Had a bad flare up and has a breathing machine she bought about 25 years ago and would like a new machine and masks. )    Subjective        Nalini Nguyễn presents to [facility]  History of Present Illness    Needs a new nebulizer     Objective   Vital Signs:  /80 (BP Location: Left arm, Patient Position: Sitting, Cuff Size: Large Adult)   Pulse 72   Temp 98 °F (36.7 °C) (Infrared)   Wt 122 kg (270 lb)   SpO2 96%   BMI 43.58 kg/m²   Estimated body mass index is 43.58 kg/m² as calculated from the following:    Height as of 5/31/24: 167.6 cm (66\").    Weight as of this encounter: 122 kg (270 lb).            Physical Exam  Vitals reviewed.   Constitutional:       General: She is not in acute distress.     Appearance: Normal appearance.   HENT:      Head: Normocephalic and atraumatic.      Nose: Nose normal.      Mouth/Throat:      Mouth: Mucous membranes are moist.   Eyes:      Conjunctiva/sclera: Conjunctivae normal.   Cardiovascular:      Rate and Rhythm: Normal rate and regular rhythm.      Pulses: Normal pulses.   Pulmonary:      Effort: Pulmonary effort is normal.      Breath sounds: Wheezing present.   Abdominal:      Palpations: Abdomen is soft.      Tenderness: There is no abdominal tenderness.   Musculoskeletal:      Right lower leg: No edema.      Left lower leg: No edema.   Skin:     General: Skin is warm and dry.   Neurological:      General: No focal deficit present.      Mental Status: She is alert.   Psychiatric:         Mood and Affect: Mood normal.        Result Review :  The following data was reviewed by: Mesha Nicholas MD on 10/04/2024:  Common labs          5/24/2024    08:47   Common Labs   Glucose 206    BUN 14    Creatinine 0.77    Sodium 141    Potassium 4.6    Chloride 105    Calcium 9.3    Total Protein 6.1    Albumin 3.9    Total Bilirubin 0.4    Alkaline Phosphatase 152    AST (SGOT) 30    ALT (SGPT) 88    WBC 7.31 "    Hemoglobin 14.7    Hematocrit 45.4    Platelets 219    Total Cholesterol 216    Triglycerides 172    HDL Cholesterol 48    LDL Cholesterol  137    Hemoglobin A1C 7.40      Data reviewed : reviewed my prior note, [facility] note           Assessment and Plan   Diagnoses and all orders for this visit:    1. Annual physical exam (Primary)    2. Type 2 diabetes mellitus with hyperglycemia, without long-term current use of insulin  -     Hemoglobin A1c  -     CBC & Differential  -     Microalbumin / Creatinine Urine Ratio - Urine, Clean Catch    3. Asthma-COPD overlap syndrome  -     Comprehensive Metabolic Panel  -     Hypertonic Nasal Wash (Sinus Rinse) pack; Use 1 time daily when sinuses are flared and bothered.  Dispense: 70 each; Refill: 3  -     azithromycin (Zithromax Z-Kirit) 250 MG tablet; Take 2 tablets the first day, then 1 tablet daily for 6 days.  Dispense: 8 tablet; Refill: 0  -     predniSONE (DELTASONE) 10 MG tablet; Take 2 tablets by mouth Daily for 3 days, THEN 1 tablet Daily for 3 days, THEN 0.5 tablets Daily for 4 days.  Dispense: 11 tablet; Refill: 0  -     Ambulatory Referral to Pulmonology    4. Mixed hyperlipidemia  -     Lipid Panel With LDL / HDL Ratio    5. Colon cancer screening  -     Cologuard - Stool, Per Rectum; Future    6. Mild persistent asthma with exacerbation  -     promethazine-dextromethorphan (PROMETHAZINE-DM) 6.25-15 MG/5ML syrup; Take 5 mL by mouth At Night As Needed for Cough. Use when ill only as discussed.  Dispense: 180 mL; Refill: 2    7. Hemorrhoids, unspecified hemorrhoid type  -     Hydrocortisone, Perianal, (Preparation H) 1 % cream rectal cream; Insert  into the rectum Daily.  Dispense: 26 g; Refill: 2      Reviewed all pertinent vaccines for age and discussed healthy weight, exercise.  Patient will be screened with above labs and had physical exam and history taken.  Discussed ways to improve ASCVD health and general mental/physical health.     Vaccine conversation today  and she will consider vaccination more.      Discussed continuing to have multiple exacerbations per year and making sure that pulm would not have additional input for her.  Referral placed.  Treat for acute exacerbation today.         Follow Up   Return in about 6 months (around 4/4/2025) for f/u T2DM, COPD-asthma crossover.  Patient was given instructions and counseling regarding her condition or for health maintenance advice. Please see specific information pulled into the AVS if appropriate.

## 2024-10-05 LAB
ALBUMIN SERPL-MCNC: 4.2 G/DL (ref 3.5–5.2)
ALBUMIN/GLOB SERPL: 1.8 G/DL
ALP SERPL-CCNC: 162 U/L (ref 39–117)
ALT SERPL-CCNC: 43 U/L (ref 1–33)
AST SERPL-CCNC: 39 U/L (ref 1–32)
BASOPHILS # BLD AUTO: 0.06 10*3/MM3 (ref 0–0.2)
BASOPHILS NFR BLD AUTO: 0.8 % (ref 0–1.5)
BILIRUB SERPL-MCNC: 0.5 MG/DL (ref 0–1.2)
BUN SERPL-MCNC: 11 MG/DL (ref 6–20)
BUN/CREAT SERPL: 14.9 (ref 7–25)
CALCIUM SERPL-MCNC: 9.5 MG/DL (ref 8.6–10.5)
CHLORIDE SERPL-SCNC: 102 MMOL/L (ref 98–107)
CHOLEST SERPL-MCNC: 238 MG/DL (ref 0–200)
CO2 SERPL-SCNC: 27.2 MMOL/L (ref 22–29)
CREAT SERPL-MCNC: 0.74 MG/DL (ref 0.57–1)
EGFRCR SERPLBLD CKD-EPI 2021: 96.9 ML/MIN/1.73
EOSINOPHIL # BLD AUTO: 0.96 10*3/MM3 (ref 0–0.4)
EOSINOPHIL NFR BLD AUTO: 12.5 % (ref 0.3–6.2)
ERYTHROCYTE [DISTWIDTH] IN BLOOD BY AUTOMATED COUNT: 12.3 % (ref 12.3–15.4)
GLOBULIN SER CALC-MCNC: 2.4 GM/DL
GLUCOSE SERPL-MCNC: 178 MG/DL (ref 65–99)
HBA1C MFR BLD: 7.4 % (ref 4.8–5.6)
HCT VFR BLD AUTO: 45.5 % (ref 34–46.6)
HDLC SERPL-MCNC: 48 MG/DL (ref 40–60)
HGB BLD-MCNC: 15.1 G/DL (ref 12–15.9)
IMM GRANULOCYTES # BLD AUTO: 0.03 10*3/MM3 (ref 0–0.05)
IMM GRANULOCYTES NFR BLD AUTO: 0.4 % (ref 0–0.5)
LDLC SERPL CALC-MCNC: 167 MG/DL (ref 0–100)
LDLC/HDLC SERPL: 3.43 {RATIO}
LYMPHOCYTES # BLD AUTO: 1.51 10*3/MM3 (ref 0.7–3.1)
LYMPHOCYTES NFR BLD AUTO: 19.6 % (ref 19.6–45.3)
MCH RBC QN AUTO: 29 PG (ref 26.6–33)
MCHC RBC AUTO-ENTMCNC: 33.2 G/DL (ref 31.5–35.7)
MCV RBC AUTO: 87.5 FL (ref 79–97)
MONOCYTES # BLD AUTO: 0.45 10*3/MM3 (ref 0.1–0.9)
MONOCYTES NFR BLD AUTO: 5.9 % (ref 5–12)
NEUTROPHILS # BLD AUTO: 4.68 10*3/MM3 (ref 1.7–7)
NEUTROPHILS NFR BLD AUTO: 60.8 % (ref 42.7–76)
NRBC BLD AUTO-RTO: 0 /100 WBC (ref 0–0.2)
PLATELET # BLD AUTO: 240 10*3/MM3 (ref 140–450)
POTASSIUM SERPL-SCNC: 4.8 MMOL/L (ref 3.5–5.2)
PROT SERPL-MCNC: 6.6 G/DL (ref 6–8.5)
RBC # BLD AUTO: 5.2 10*6/MM3 (ref 3.77–5.28)
SODIUM SERPL-SCNC: 139 MMOL/L (ref 136–145)
TRIGL SERPL-MCNC: 127 MG/DL (ref 0–150)
VLDLC SERPL CALC-MCNC: 23 MG/DL (ref 5–40)
WBC # BLD AUTO: 7.69 10*3/MM3 (ref 3.4–10.8)

## 2024-10-15 RX ORDER — KETOCONAZOLE 20 MG/ML
SHAMPOO TOPICAL 2 TIMES WEEKLY
Qty: 120 ML | Refills: 0 | Status: SHIPPED | OUTPATIENT
Start: 2024-10-17

## 2024-10-20 ENCOUNTER — PATIENT MESSAGE (OUTPATIENT)
Dept: INTERNAL MEDICINE | Facility: CLINIC | Age: 54
End: 2024-10-20

## 2024-10-20 DIAGNOSIS — E11.65 TYPE 2 DIABETES MELLITUS WITH HYPERGLYCEMIA, WITHOUT LONG-TERM CURRENT USE OF INSULIN: Primary | ICD-10-CM

## 2024-10-23 ENCOUNTER — TELEPHONE (OUTPATIENT)
Dept: INTERNAL MEDICINE | Facility: CLINIC | Age: 54
End: 2024-10-23

## 2024-10-23 NOTE — TELEPHONE ENCOUNTER
Returned pts call as letter stated Pulm has made mult calls --w/ no return calls. She needs to call for the appt--pt will call tomorrow for appt

## 2024-10-23 NOTE — TELEPHONE ENCOUNTER
Hub staff attempted to follow warm transfer process and was unsuccessful     Caller: Nalini Nguyễn    Relationship to patient: Self    Best call back number: [phone]     Patient is needing: [facility] TRIED REACHING OUT IN REGARDS TO AN APPOINTMENT.    PLEASE CALL TO ADVISE.

## 2024-10-29 DIAGNOSIS — E11.65 TYPE 2 DIABETES MELLITUS WITH HYPERGLYCEMIA, WITHOUT LONG-TERM CURRENT USE OF INSULIN: Primary | ICD-10-CM

## 2024-12-02 ENCOUNTER — TELEPHONE (OUTPATIENT)
Dept: INTERNAL MEDICINE | Facility: CLINIC | Age: 54
End: 2024-12-02

## 2024-12-02 NOTE — TELEPHONE ENCOUNTER
Caller: Nalini Nguyễn    Relationship: Self    Best call back number: [phone]    What is the best time to reach you: ANYTIME    Who are you requesting to speak with (clinical staff, provider,  specific staff member): CLINICAL    What was the call regarding:   PATIENT CALLED AND STATED THAT HER FORM HER AND DR. CLARK HAVE BEEN CHATTING ABOUT IS DUE TODAY AND IF IT DOESN'T MEET THE DEADLINE THE PATIENT WILL LOOSE HER BENEFITS. SHE'S CALLING TO REQUEST IT BE SENT TO THE FAX LISTED ON THE PAPER.     PATIENT WOULD ALSO LIKE A CALLBACK TO KNOW WHEN IT HAS BEEN FAXED IF POSSIBLE

## 2024-12-13 ENCOUNTER — OFFICE VISIT (OUTPATIENT)
Dept: INTERNAL MEDICINE | Facility: CLINIC | Age: 54
End: 2024-12-13
Payer: MEDICAID

## 2024-12-13 VITALS
DIASTOLIC BLOOD PRESSURE: 72 MMHG | TEMPERATURE: 98.2 F | BODY MASS INDEX: 44.87 KG/M2 | WEIGHT: 278 LBS | SYSTOLIC BLOOD PRESSURE: 120 MMHG | OXYGEN SATURATION: 97 % | HEART RATE: 67 BPM

## 2024-12-13 DIAGNOSIS — E11.65 TYPE 2 DIABETES MELLITUS WITH HYPERGLYCEMIA, WITHOUT LONG-TERM CURRENT USE OF INSULIN: ICD-10-CM

## 2024-12-13 DIAGNOSIS — M62.830 PARASPINAL MUSCLE SPASM: Primary | ICD-10-CM

## 2024-12-13 DIAGNOSIS — G89.4 CHRONIC PAIN SYNDROME: ICD-10-CM

## 2024-12-13 PROCEDURE — 3051F HG A1C>EQUAL 7.0%<8.0%: CPT | Performed by: INTERNAL MEDICINE

## 2024-12-13 PROCEDURE — 1159F MED LIST DOCD IN RCRD: CPT | Performed by: INTERNAL MEDICINE

## 2024-12-13 PROCEDURE — 1160F RVW MEDS BY RX/DR IN RCRD: CPT | Performed by: INTERNAL MEDICINE

## 2024-12-13 PROCEDURE — 99214 OFFICE O/P EST MOD 30 MIN: CPT | Performed by: INTERNAL MEDICINE

## 2024-12-13 RX ORDER — DICLOFENAC SODIUM 75 MG/1
75 TABLET, DELAYED RELEASE ORAL 2 TIMES DAILY
Qty: 60 TABLET | Refills: 1 | Status: SHIPPED | OUTPATIENT
Start: 2024-12-13

## 2024-12-13 RX ORDER — BENZOCAINE/MENTHOL 6 MG-10 MG
LOZENGE MUCOUS MEMBRANE
COMMUNITY
Start: 2024-11-04

## 2024-12-13 RX ORDER — HYDROCODONE BITARTRATE AND ACETAMINOPHEN 10; 325 MG/1; MG/1
1 TABLET ORAL 2 TIMES DAILY
Qty: 60 TABLET | Refills: 0 | Status: SHIPPED | OUTPATIENT
Start: 2024-12-13

## 2024-12-13 RX ORDER — LIDOCAINE 50 MG/G
1 PATCH TOPICAL EVERY 24 HOURS
Qty: 30 EACH | Refills: 1 | Status: SHIPPED | OUTPATIENT
Start: 2024-12-13

## 2024-12-13 NOTE — PROGRESS NOTES
Nalini Nguyễn is a 53 y.o. female who presents with a chief complaint of   Chief Complaint   Patient presents with    Back Pain     Has a knot on her back and has been very painful when she moves a certain way it makes it pop and hurt more has calmed down a little bit but still hurts     Leg Pain     Has been burning cramping between her legs and behind her knees and both have been going on for a couple weeks     Pain     Does not want to go to pain management has been taking hydrocodone for years       disablity      Would like to talk about Disability paperwork       HPI     Very brief nausea that she thinks is migraine related.  Ozempic going okay so far.  Has not been checking sugars regularly, but when she was it was 120-150 in the AM.     Back pain x2 weeks.  She has had some improvement in pain recently.       The following portions of the patient's history were reviewed and updated as appropriate: allergies, current medications, past family history, past medical history, past social history, past surgical history and problem list.      Current Outpatient Medications:     Advair Diskus 250-50 MCG/ACT DISKUS, Inhale 1 puff 2 (Two) Times a Day., Disp: 60 each, Rfl: 6    albuterol sulfate  (90 Base) MCG/ACT inhaler, Inhale 2 puffs Every 6 (Six) Hours As Needed for Wheezing or Shortness of Air., Disp: 18 g, Rfl: 6    Blood Glucose Monitoring Suppl (D-Care Glucometer) w/Device kit, Use 1 Device 3 (Three) Times a Day As Needed (glucose checks)., Disp: 1 each, Rfl: 6    clonazePAM (KlonoPIN) 1 MG tablet, Take 1 tablet by mouth 2 (Two) Times a Day., Disp: , Rfl:     fexofenadine (ALLEGRA) 60 MG tablet, Take 3 tablets by mouth Daily for 90 days., Disp: 90 tablet, Rfl: 2    fluticasone (FLONASE) 50 MCG/ACT nasal spray, 1 spray by Each Nare route Daily., Disp: , Rfl:     gabapentin (NEURONTIN) 300 MG capsule, Take 1 capsule by mouth Every Evening., Disp: 90 capsule, Rfl: 0    glucose blood test strip, Use  once a day for blood sugar checks., Disp: 100 each, Rfl: 12    guaifenesin (ROBITUSSIN) 100 MG/5ML liquid, Take 10 mL by mouth 3 (Three) Times a Day As Needed for Cough., Disp: 180 mL, Rfl: 0    hydroCHLOROthiazide (MICROZIDE) 12.5 MG capsule, Take 1 capsule by mouth Daily., Disp: , Rfl:     HYDROcodone-acetaminophen (NORCO)  MG per tablet, Take 1 tablet by mouth 2 (Two) Times a Day., Disp: 60 tablet, Rfl: 0    Hydrocortisone, Perianal, (Preparation H) 1 % cream rectal cream, Insert  into the rectum Daily., Disp: 26 g, Rfl: 2    Hypertonic Nasal Wash (Sinus Rinse) pack, Use 1 time daily when sinuses are flared and bothered., Disp: 70 each, Rfl: 3    ibuprofen (ADVIL,MOTRIN) 800 MG tablet, Take 1 tablet by mouth 3 (Three) Times a Day., Disp: , Rfl:     ipratropium-albuterol (DUO-NEB) 0.5-2.5 mg/3 ml nebulizer, INHALE 1 VIAL BY MOUTH VIA NEBULIZER 4 TIMES DAILY AT 08:00AM, 12:00 NOON, 04:00PM, 08:00PM., Disp: , Rfl:     ketoconazole (NIZORAL) 2 % shampoo, APPLY TOPICALLY TO THE APPROPRIATE AREA AS DIRECTED 2 (TWO) TIMES A WEEK., Disp: 120 mL, Rfl: 0    Lancets (accu-chek safe-t pro) lancets, Use once a day for blood sugar checks., Disp: 200 each, Rfl: 12    methocarbamol (ROBAXIN) 500 MG tablet, Take 1 tablet by mouth 3 (Three) Times a Day As Needed for Muscle Spasms., Disp: 90 tablet, Rfl: 3    promethazine-dextromethorphan (PROMETHAZINE-DM) 6.25-15 MG/5ML syrup, Take 5 mL by mouth At Night As Needed for Cough. Use when ill only as discussed., Disp: 180 mL, Rfl: 2    Semaglutide,0.25 or 0.5MG/DOS, (OZEMPIC) 2 MG/3ML solution pen-injector, Inject 0.25 mg under the skin into the appropriate area as directed 1 (One) Time Per Week for 30 days, THEN 0.5 mg 1 (One) Time Per Week for 30 days., Disp: 5 mL, Rfl: 0    triamcinolone (KENALOG) 0.5 % ointment, Apply 1 Application topically to the appropriate area as directed 2 (Two) Times a Day., Disp: , Rfl:     Triamcinolone Acetonide (NASACORT) 55 MCG/ACT nasal inhaler,  2 sprays into the nostril(s) as directed by provider Daily., Disp: 16.5 g, Rfl: 6    vitamin D (ERGOCALCIFEROL) 1.25 MG (19791 UT) capsule capsule, Take 1 capsule by mouth Every 7 (Seven) Days., Disp: 12 capsule, Rfl: 0    diclofenac (VOLTAREN) 75 MG EC tablet, Take 1 tablet by mouth 2 (Two) Times a Day. Do this for one week and then try to taper to once daily and then come off. Do not take ibuprofen with Diclofenac., Disp: 60 tablet, Rfl: 1    hydrocortisone 1 % cream, , Disp: , Rfl:     levothyroxine (SYNTHROID, LEVOTHROID) 75 MCG tablet, Take 1 tablet by mouth Daily., Disp: , Rfl:     lidocaine (LIDODERM) 5 %, Place 1 patch on the skin as directed by provider Daily. Remove & Discard patch within 12 hours or as directed by MD, Disp: 30 each, Rfl: 1    metFORMIN ER (GLUCOPHAGE-XR) 500 MG 24 hr tablet, If you are doing well with one daily, then increase to one pill in the morning and one at night. (Patient not taking: Reported on 12/13/2024), Disp: 100 tablet, Rfl: 1    topiramate (TOPAMAX) 100 MG tablet, Take 1 tablet by mouth., Disp: , Rfl:             Physical Exam  /72 (BP Location: Left arm, Patient Position: Sitting, Cuff Size: Adult)   Pulse 67   Temp 98.2 °F (36.8 °C) (Infrared)   Wt 126 kg (278 lb)   SpO2 97%   BMI 44.87 kg/m²     Physical Exam  Vitals reviewed.   Constitutional:       General: She is not in acute distress.     Appearance: Normal appearance.   HENT:      Head: Normocephalic and atraumatic.      Nose: Nose normal.      Mouth/Throat:      Mouth: Mucous membranes are moist.   Eyes:      Conjunctiva/sclera: Conjunctivae normal.   Pulmonary:      Effort: Pulmonary effort is normal.   Skin:     General: Skin is warm and dry.   Neurological:      General: No focal deficit present.      Mental Status: She is alert.   Psychiatric:         Mood and Affect: Mood normal.           Results for orders placed or performed in visit on 10/04/24   Comprehensive Metabolic Panel    Collection Time:  10/04/24 10:27 AM    Specimen: Blood   Result Value Ref Range    Glucose 178 (H) 65 - 99 mg/dL    BUN 11 6 - 20 mg/dL    Creatinine 0.74 0.57 - 1.00 mg/dL    EGFR Result 96.9 >60.0 mL/min/1.73    BUN/Creatinine Ratio 14.9 7.0 - 25.0    Sodium 139 136 - 145 mmol/L    Potassium 4.8 3.5 - 5.2 mmol/L    Chloride 102 98 - 107 mmol/L    Total CO2 27.2 22.0 - 29.0 mmol/L    Calcium 9.5 8.6 - 10.5 mg/dL    Total Protein 6.6 6.0 - 8.5 g/dL    Albumin 4.2 3.5 - 5.2 g/dL    Globulin 2.4 gm/dL    A/G Ratio 1.8 g/dL    Total Bilirubin 0.5 0.0 - 1.2 mg/dL    Alkaline Phosphatase 162 (H) 39 - 117 U/L    AST (SGOT) 39 (H) 1 - 32 U/L    ALT (SGPT) 43 (H) 1 - 33 U/L   Lipid Panel With LDL / HDL Ratio    Collection Time: 10/04/24 10:27 AM    Specimen: Blood   Result Value Ref Range    Total Cholesterol 238 (H) 0 - 200 mg/dL    Triglycerides 127 0 - 150 mg/dL    HDL Cholesterol 48 40 - 60 mg/dL    VLDL Cholesterol Sammy 23 5 - 40 mg/dL    LDL Chol Calc (NIH) 167 (H) 0 - 100 mg/dL    LDL/HDL RATIO 3.43    Hemoglobin A1c    Collection Time: 10/04/24 10:27 AM    Specimen: Blood   Result Value Ref Range    Hemoglobin A1C 7.40 (H) 4.80 - 5.60 %   CBC & Differential    Collection Time: 10/04/24 10:27 AM    Specimen: Blood   Result Value Ref Range    WBC 7.69 3.40 - 10.80 10*3/mm3    RBC 5.20 3.77 - 5.28 10*6/mm3    Hemoglobin 15.1 12.0 - 15.9 g/dL    Hematocrit 45.5 34.0 - 46.6 %    MCV 87.5 79.0 - 97.0 fL    MCH 29.0 26.6 - 33.0 pg    MCHC 33.2 31.5 - 35.7 g/dL    RDW 12.3 12.3 - 15.4 %    Platelets 240 140 - 450 10*3/mm3    Neutrophil Rel % 60.8 42.7 - 76.0 %    Lymphocyte Rel % 19.6 19.6 - 45.3 %    Monocyte Rel % 5.9 5.0 - 12.0 %    Eosinophil Rel % 12.5 (H) 0.3 - 6.2 %    Basophil Rel % 0.8 0.0 - 1.5 %    Neutrophils Absolute 4.68 1.70 - 7.00 10*3/mm3    Lymphocytes Absolute 1.51 0.70 - 3.10 10*3/mm3    Monocytes Absolute 0.45 0.10 - 0.90 10*3/mm3    Eosinophils Absolute 0.96 (H) 0.00 - 0.40 10*3/mm3    Basophils Absolute 0.06 0.00 - 0.20  10*3/mm3    Immature Granulocyte Rel % 0.4 0.0 - 0.5 %    Immature Grans Absolute 0.03 0.00 - 0.05 10*3/mm3    nRBC 0.0 0.0 - 0.2 /100 WBC           Diagnoses and all orders for this visit:    1. Paraspinal muscle spasm (Primary)  -     diclofenac (VOLTAREN) 75 MG EC tablet; Take 1 tablet by mouth 2 (Two) Times a Day. Do this for one week and then try to taper to once daily and then come off. Do not take ibuprofen with Diclofenac.  Dispense: 60 tablet; Refill: 1  -     lidocaine (LIDODERM) 5 %; Place 1 patch on the skin as directed by provider Daily. Remove & Discard patch within 12 hours or as directed by MD  Dispense: 30 each; Refill: 1  -     Urine Drug Screen - Urine, Clean Catch  -     HYDROcodone-acetaminophen (NORCO)  MG per tablet; Take 1 tablet by mouth 2 (Two) Times a Day.  Dispense: 60 tablet; Refill: 0    2. Type 2 diabetes mellitus with hyperglycemia, without long-term current use of insulin    3. Chronic pain syndrome  -     Urine Drug Screen - Urine, Clean Catch  -     HYDROcodone-acetaminophen (NORCO)  MG per tablet; Take 1 tablet by mouth 2 (Two) Times a Day.  Dispense: 60 tablet; Refill: 0        Discussed use of opiates today and her long history of use at a low level.  She has had the supply from her previous out of state doctor for over 1 year.  We completed above paperwork and UDS today.

## 2024-12-14 LAB
AMPHETAMINES UR QL SCN: NEGATIVE NG/ML
BARBITURATES UR QL SCN: NEGATIVE NG/ML
BENZODIAZ UR QL SCN: NEGATIVE NG/ML
BZE UR QL SCN: NEGATIVE NG/ML
CANNABINOIDS UR QL SCN: NEGATIVE NG/ML
CREAT UR-MCNC: 93.6 MG/DL (ref 20–300)
LABORATORY COMMENT REPORT: NORMAL
METHADONE UR QL SCN: NEGATIVE NG/ML
OPIATES UR QL SCN: NEGATIVE NG/ML
OXYCODONE+OXYMORPHONE UR QL SCN: NEGATIVE NG/ML
PCP UR QL: NEGATIVE NG/ML
PH UR: 4.9 [PH] (ref 4.5–8.9)
PROPOXYPH UR QL SCN: NEGATIVE NG/ML

## 2024-12-23 ENCOUNTER — PATIENT MESSAGE (OUTPATIENT)
Dept: INTERNAL MEDICINE | Facility: CLINIC | Age: 54
End: 2024-12-23
Payer: MEDICAID

## 2024-12-23 DIAGNOSIS — E11.65 TYPE 2 DIABETES MELLITUS WITH HYPERGLYCEMIA, WITHOUT LONG-TERM CURRENT USE OF INSULIN: Primary | ICD-10-CM

## 2025-01-03 DIAGNOSIS — E11.65 TYPE 2 DIABETES MELLITUS WITH HYPERGLYCEMIA, WITHOUT LONG-TERM CURRENT USE OF INSULIN: Primary | ICD-10-CM

## 2025-01-06 ENCOUNTER — TELEMEDICINE (OUTPATIENT)
Dept: FAMILY MEDICINE CLINIC | Facility: TELEHEALTH | Age: 55
End: 2025-01-06
Payer: MEDICAID

## 2025-01-06 DIAGNOSIS — J01.10 ACUTE FRONTAL SINUSITIS, RECURRENCE NOT SPECIFIED: Primary | ICD-10-CM

## 2025-01-06 PROCEDURE — 99213 OFFICE O/P EST LOW 20 MIN: CPT | Performed by: NURSE PRACTITIONER

## 2025-01-06 RX ORDER — PREDNISONE 10 MG/1
TABLET ORAL
Qty: 21 TABLET | Refills: 0 | Status: SHIPPED | OUTPATIENT
Start: 2025-01-06

## 2025-01-06 NOTE — PROGRESS NOTES
You have chosen to receive care through a telehealth visit.  Do you consent to use a video/audio connection for your medical care today? Yes     Patient or patient representative verbalized consent for the use of Ambient Listening during the visit with  DONNA Cowart for chart documentation. 1/6/2025  13:14 EST    CHIEF COMPLAINT  No chief complaint on file.        HPI  Nalini Nguyễn is a 54 y.o. female  presents with complaint of    History of Present Illness  The patient is a 54-year-old female presenting with complaints of sinus infection symptoms.    She began experiencing symptoms last Friday night, which escalated to a sensation of throat swelling upon awakening on Saturday morning. She reports a feeling of pressure in her head, specifically between her eyes, and describes a popping sensation in her ears. Given her past medical history of sinus issues and infections, she is confident that these are the same. She has been self-medicating with leftover Robitussin cough syrup and promethazine. She is allergic to AZITHROMYCIN and ASPIRIN. She notes that she has a reaction to AZITHROMYCIN when it is administered intravenously, but she tolerates it well when taken orally.    ALLERGIES  The patient is allergic to AZITHROMYCIN and ASPIRIN.    MEDICATIONS  Robitussin, promethazine       Review of Systems  See HPI    Past Medical History:   Diagnosis Date    Allergic 1993    Anxiety 2005    Arthritis 1999    Asthma 1993    COPD (chronic obstructive pulmonary disease) 2015    Depression 2005    GERD (gastroesophageal reflux disease) 1994    Headache 2008    Low back pain 1999    Neuromuscular disorder 2007    Obesity 2000       Family History   Problem Relation Age of Onset    Arthritis Mother     Cancer Mother     Diabetes Mother     Hearing loss Mother     Hyperlipidemia Mother     Miscarriages / Stillbirths Mother     Thyroid disease Mother     Vision loss Mother     Alcohol abuse Father     Asthma Father         Social History     Socioeconomic History    Marital status:    Tobacco Use    Smoking status: Never     Passive exposure: Yes    Smokeless tobacco: Never    Tobacco comments:     only smoked here and there not a consistent smoker. I've quit smoking altogether for over 4 yrs   Vaping Use    Vaping status: Never Used   Substance and Sexual Activity    Alcohol use: Never    Drug use: Never    Sexual activity: Yes     Partners: Male     Birth control/protection: Tubal ligation       Nalini Nguyễn  reports that she has never smoked. She has been exposed to tobacco smoke. She has never used smokeless tobacco.             There were no vitals taken for this visit.    PHYSICAL EXAM  Physical Exam   Constitutional: She is oriented to person, place, and time. She appears well-developed and well-nourished. She does not have a sickly appearance. She does not appear ill.   HENT:   Head: Normocephalic and atraumatic.   Nose: Right sinus exhibits frontal sinus tenderness. Left sinus exhibits frontal sinus tenderness.   Pulmonary/Chest: Effort normal.  No respiratory distress.  Neurological: She is alert and oriented to person, place, and time.         Diagnoses and all orders for this visit:    1. Acute frontal sinusitis, recurrence not specified (Primary)  -     amoxicillin-clavulanate (AUGMENTIN) 875-125 MG per tablet; Take 1 tablet by mouth 2 (Two) Times a Day for 10 days.  Dispense: 20 tablet; Refill: 0  -     predniSONE (DELTASONE) 10 MG (21) dose pack; Use as directed on package  Dispense: 21 tablet; Refill: 0    --take medications as prescribed  --increase fluids, rest as needed, tylenol or ibuprofen for pain  --f/u in 5-7 days if no improvement      Assessment & Plan  1. Sinusitis.  She reports symptoms of sinus infection including swollen throat, pressure between the eyes, and ear popping. She has a history of sinus problems and sinus infections. A prescription for Augmentin 875 mg, to be taken twice daily  for 10 days, has been issued. Additionally, a prednisone pack has been prescribed, to be taken as directed on the package. The medications will be sent to [facility] on [address]         FOLLOW-UP  As discussed during visit with PCP/[facility] if no improvement or Urgent Care/Emergency Department if worsening of symptoms    Patient verbalizes understanding of medication dosage, comfort measures, instructions for treatment and follow-up.    Yandy CHAPARROJuan Manuel Huang, APRN  01/06/2025  13:14 EST    Mode of Visit: Video  Location of patient: -HOME-  Location of provider: +HOME+  You have chosen to receive care through a telehealth visit.  The patient has signed the video visit consent form.  The visit included audio and video interaction. No technical issues occurred during this visit.      Note Disclaimer: At [facility], we believe that sharing information builds trust and better   relationships. You are receiving this note because you recently visited [facility]. It is possible you   will see health information before a provider has talked with you about it. This kind of information can   be easy to misunderstand. To help you fully understand what it means for your health, we urge you to   discuss this note with your provider.

## 2025-01-13 DIAGNOSIS — M62.830 PARASPINAL MUSCLE SPASM: ICD-10-CM

## 2025-01-13 DIAGNOSIS — G89.4 CHRONIC PAIN SYNDROME: ICD-10-CM

## 2025-01-13 NOTE — TELEPHONE ENCOUNTER
Rx Refill Note  Requested Prescriptions      No prescriptions requested or ordered in this encounter      Last office visit with prescribing clinician: 12/13/2024   Last telemedicine visit with prescribing clinician: Visit date not found   Next office visit with prescribing clinician: 4/8/2025                         Would you like a call back once the refill request has been completed: [] Yes [] No    If the office needs to give you a call back, can they leave a voicemail: [] Yes [] No    Robina Romero MA  01/13/25, 08:49 EST

## 2025-01-14 RX ORDER — HYDROCODONE BITARTRATE AND ACETAMINOPHEN 10; 325 MG/1; MG/1
1 TABLET ORAL 2 TIMES DAILY
Qty: 60 TABLET | Refills: 0 | Status: SHIPPED | OUTPATIENT
Start: 2025-01-14

## 2025-01-15 ENCOUNTER — TRANSCRIBE ORDERS (OUTPATIENT)
Dept: PULMONOLOGY | Facility: HOSPITAL | Age: 55
End: 2025-01-15
Payer: MEDICAID

## 2025-01-15 DIAGNOSIS — R06.83 SNORING: Primary | ICD-10-CM

## 2025-01-17 ENCOUNTER — TRANSCRIBE ORDERS (OUTPATIENT)
Dept: ADMINISTRATIVE | Facility: HOSPITAL | Age: 55
End: 2025-01-17
Payer: MEDICAID

## 2025-01-17 DIAGNOSIS — J45.909 ASTHMA, UNSPECIFIED ASTHMA SEVERITY, UNSPECIFIED WHETHER COMPLICATED, UNSPECIFIED WHETHER PERSISTENT: Primary | ICD-10-CM

## 2025-01-22 ENCOUNTER — TELEPHONE (OUTPATIENT)
Dept: INTERNAL MEDICINE | Facility: CLINIC | Age: 55
End: 2025-01-22
Payer: MEDICAID

## 2025-01-22 DIAGNOSIS — E11.65 TYPE 2 DIABETES MELLITUS WITH HYPERGLYCEMIA, WITHOUT LONG-TERM CURRENT USE OF INSULIN: Primary | ICD-10-CM

## 2025-01-22 NOTE — TELEPHONE ENCOUNTER
Patient called and said she is having the same side effects with the Trulicity as she did with the Mounjaro. She is requesting a prescription for Mounjaro please. It will require a PA, so let me know when sent please

## 2025-01-27 ENCOUNTER — HOSPITAL ENCOUNTER (OUTPATIENT)
Facility: HOSPITAL | Age: 55
Setting detail: OBSERVATION
Discharge: HOME | End: 2025-01-29
Attending: EMERGENCY MEDICINE | Admitting: HOSPITALIST
Payer: MEDICAID

## 2025-01-27 DIAGNOSIS — J43.9 PULMONARY EMPHYSEMA, UNSPECIFIED EMPHYSEMA TYPE: Primary | ICD-10-CM

## 2025-01-27 DIAGNOSIS — R50.9 FEVER, UNSPECIFIED FEVER CAUSE: ICD-10-CM

## 2025-01-27 LAB
ALBUMIN SERPL-MCNC: 4.1 G/DL (ref 3.5–5.2)
ALBUMIN/GLOB SERPL: 1.4 G/DL
ALP SERPL-CCNC: 149 U/L (ref 39–117)
ALT SERPL W P-5'-P-CCNC: 54 U/L (ref 1–33)
ANION GAP SERPL CALCULATED.3IONS-SCNC: 11.4 MMOL/L (ref 5–15)
AST SERPL-CCNC: 48 U/L (ref 1–32)
BASOPHILS # BLD AUTO: 0.02 10*3/MM3 (ref 0–0.2)
BASOPHILS NFR BLD AUTO: 0.4 % (ref 0–1.5)
BILIRUB SERPL-MCNC: 0.5 MG/DL (ref 0–1.2)
BUN SERPL-MCNC: 10 MG/DL (ref 6–20)
BUN/CREAT SERPL: 12.5 (ref 7–25)
CALCIUM SPEC-SCNC: 9.1 MG/DL (ref 8.6–10.5)
CHLORIDE SERPL-SCNC: 101 MMOL/L (ref 98–107)
CO2 SERPL-SCNC: 23.6 MMOL/L (ref 22–29)
CREAT SERPL-MCNC: 0.8 MG/DL (ref 0.57–1)
D-LACTATE SERPL-SCNC: 1.9 MMOL/L (ref 0.5–2)
DEPRECATED RDW RBC AUTO: 43.5 FL (ref 37–54)
EGFRCR SERPLBLD CKD-EPI 2021: 87.7 ML/MIN/1.73
EOSINOPHIL # BLD AUTO: 0.06 10*3/MM3 (ref 0–0.4)
EOSINOPHIL NFR BLD AUTO: 1.3 % (ref 0.3–6.2)
ERYTHROCYTE [DISTWIDTH] IN BLOOD BY AUTOMATED COUNT: 13.3 % (ref 12.3–15.4)
GLOBULIN UR ELPH-MCNC: 3 GM/DL
GLUCOSE SERPL-MCNC: 167 MG/DL (ref 65–99)
HCT VFR BLD AUTO: 43 % (ref 34–46.6)
HGB BLD-MCNC: 14.3 G/DL (ref 12–15.9)
IMM GRANULOCYTES # BLD AUTO: 0.03 10*3/MM3 (ref 0–0.05)
IMM GRANULOCYTES NFR BLD AUTO: 0.7 % (ref 0–0.5)
LYMPHOCYTES # BLD AUTO: 0.53 10*3/MM3 (ref 0.7–3.1)
LYMPHOCYTES NFR BLD AUTO: 11.9 % (ref 19.6–45.3)
MCH RBC QN AUTO: 29.6 PG (ref 26.6–33)
MCHC RBC AUTO-ENTMCNC: 33.3 G/DL (ref 31.5–35.7)
MCV RBC AUTO: 89 FL (ref 79–97)
MONOCYTES # BLD AUTO: 0.47 10*3/MM3 (ref 0.1–0.9)
MONOCYTES NFR BLD AUTO: 10.6 % (ref 5–12)
NEUTROPHILS NFR BLD AUTO: 3.34 10*3/MM3 (ref 1.7–7)
NEUTROPHILS NFR BLD AUTO: 75.1 % (ref 42.7–76)
NRBC BLD AUTO-RTO: 0 /100 WBC (ref 0–0.2)
PLATELET # BLD AUTO: 173 10*3/MM3 (ref 140–450)
PMV BLD AUTO: 9.6 FL (ref 6–12)
POTASSIUM SERPL-SCNC: 4.3 MMOL/L (ref 3.5–5.2)
PROT SERPL-MCNC: 7.1 G/DL (ref 6–8.5)
RBC # BLD AUTO: 4.83 10*6/MM3 (ref 3.77–5.28)
SODIUM SERPL-SCNC: 136 MMOL/L (ref 136–145)
WBC NRBC COR # BLD AUTO: 4.45 10*3/MM3 (ref 3.4–10.8)

## 2025-01-27 PROCEDURE — 94761 N-INVAS EAR/PLS OXIMETRY MLT: CPT

## 2025-01-27 PROCEDURE — 93005 ELECTROCARDIOGRAM TRACING: CPT | Performed by: EMERGENCY MEDICINE

## 2025-01-27 PROCEDURE — 99285 EMERGENCY DEPT VISIT HI MDM: CPT | Performed by: EMERGENCY MEDICINE

## 2025-01-27 PROCEDURE — 85025 COMPLETE CBC W/AUTO DIFF WBC: CPT

## 2025-01-27 PROCEDURE — 93005 ELECTROCARDIOGRAM TRACING: CPT

## 2025-01-27 PROCEDURE — 83036 HEMOGLOBIN GLYCOSYLATED A1C: CPT | Performed by: HOSPITALIST

## 2025-01-27 PROCEDURE — 84145 PROCALCITONIN (PCT): CPT | Performed by: EMERGENCY MEDICINE

## 2025-01-27 PROCEDURE — 83605 ASSAY OF LACTIC ACID: CPT

## 2025-01-27 PROCEDURE — 94640 AIRWAY INHALATION TREATMENT: CPT

## 2025-01-27 PROCEDURE — 80053 COMPREHEN METABOLIC PANEL: CPT

## 2025-01-27 RX ORDER — PREDNISONE 20 MG/1
60 TABLET ORAL ONCE
Status: COMPLETED | OUTPATIENT
Start: 2025-01-28 | End: 2025-01-28

## 2025-01-27 RX ORDER — IBUPROFEN 400 MG/1
800 TABLET, FILM COATED ORAL ONCE
Status: COMPLETED | OUTPATIENT
Start: 2025-01-27 | End: 2025-01-27

## 2025-01-27 RX ORDER — ACETAMINOPHEN 500 MG
1000 TABLET ORAL ONCE
Status: COMPLETED | OUTPATIENT
Start: 2025-01-28 | End: 2025-01-28

## 2025-01-27 RX ORDER — IPRATROPIUM BROMIDE AND ALBUTEROL SULFATE 2.5; .5 MG/3ML; MG/3ML
3 SOLUTION RESPIRATORY (INHALATION) ONCE
Status: COMPLETED | OUTPATIENT
Start: 2025-01-27 | End: 2025-01-27

## 2025-01-27 RX ADMIN — IBUPROFEN 800 MG: 400 TABLET, FILM COATED ORAL at 23:16

## 2025-01-27 RX ADMIN — IPRATROPIUM BROMIDE AND ALBUTEROL SULFATE 3 ML: .5; 3 SOLUTION RESPIRATORY (INHALATION) at 23:33

## 2025-01-28 ENCOUNTER — APPOINTMENT (OUTPATIENT)
Dept: GENERAL RADIOLOGY | Facility: HOSPITAL | Age: 55
End: 2025-01-28
Payer: MEDICAID

## 2025-01-28 PROBLEM — R06.00 DYSPNEA: Status: ACTIVE | Noted: 2025-01-28

## 2025-01-28 LAB
GLUCOSE BLDC GLUCOMTR-MCNC: 279 MG/DL (ref 70–130)
GLUCOSE BLDC GLUCOMTR-MCNC: 299 MG/DL (ref 70–130)
GLUCOSE BLDC GLUCOMTR-MCNC: 341 MG/DL (ref 70–130)
GLUCOSE BLDC GLUCOMTR-MCNC: 352 MG/DL (ref 70–130)
PROCALCITONIN SERPL-MCNC: 0.09 NG/ML (ref 0–0.25)
PROCALCITONIN SERPL-MCNC: 0.11 NG/ML (ref 0–0.25)
QT INTERVAL: 310 MS
QTC INTERVAL: 407 MS

## 2025-01-28 PROCEDURE — 94799 UNLISTED PULMONARY SVC/PX: CPT

## 2025-01-28 PROCEDURE — G0378 HOSPITAL OBSERVATION PER HR: HCPCS

## 2025-01-28 PROCEDURE — 94761 N-INVAS EAR/PLS OXIMETRY MLT: CPT

## 2025-01-28 PROCEDURE — 82948 REAGENT STRIP/BLOOD GLUCOSE: CPT

## 2025-01-28 PROCEDURE — 63710000001 PREDNISONE PER 1 MG: Performed by: EMERGENCY MEDICINE

## 2025-01-28 PROCEDURE — 71045 X-RAY EXAM CHEST 1 VIEW: CPT

## 2025-01-28 PROCEDURE — 63710000001 INSULIN LISPRO (HUMAN) PER 5 UNITS: Performed by: FAMILY MEDICINE

## 2025-01-28 PROCEDURE — 96376 TX/PRO/DX INJ SAME DRUG ADON: CPT

## 2025-01-28 PROCEDURE — 94664 DEMO&/EVAL PT USE INHALER: CPT

## 2025-01-28 PROCEDURE — 25010000002 METHYLPREDNISOLONE PER 40 MG: Performed by: FAMILY MEDICINE

## 2025-01-28 PROCEDURE — 84145 PROCALCITONIN (PCT): CPT | Performed by: HOSPITALIST

## 2025-01-28 PROCEDURE — 96374 THER/PROPH/DIAG INJ IV PUSH: CPT

## 2025-01-28 RX ORDER — IPRATROPIUM BROMIDE AND ALBUTEROL SULFATE 2.5; .5 MG/3ML; MG/3ML
3 SOLUTION RESPIRATORY (INHALATION)
Status: DISCONTINUED | OUTPATIENT
Start: 2025-01-28 | End: 2025-01-29 | Stop reason: HOSPADM

## 2025-01-28 RX ORDER — HYDROCODONE BITARTRATE AND ACETAMINOPHEN 10; 325 MG/1; MG/1
1 TABLET ORAL 2 TIMES DAILY
Status: DISCONTINUED | OUTPATIENT
Start: 2025-01-28 | End: 2025-01-29 | Stop reason: HOSPADM

## 2025-01-28 RX ORDER — ACETAMINOPHEN 650 MG/1
650 SUPPOSITORY RECTAL EVERY 4 HOURS PRN
Status: DISCONTINUED | OUTPATIENT
Start: 2025-01-28 | End: 2025-01-29 | Stop reason: HOSPADM

## 2025-01-28 RX ORDER — NICOTINE POLACRILEX 4 MG
15 LOZENGE BUCCAL
Status: DISCONTINUED | OUTPATIENT
Start: 2025-01-28 | End: 2025-01-29 | Stop reason: HOSPADM

## 2025-01-28 RX ORDER — CLONAZEPAM 0.5 MG/1
1 TABLET ORAL 2 TIMES DAILY PRN
Status: DISCONTINUED | OUTPATIENT
Start: 2025-01-28 | End: 2025-01-29 | Stop reason: HOSPADM

## 2025-01-28 RX ORDER — POLYETHYLENE GLYCOL 3350 17 G/17G
17 POWDER, FOR SOLUTION ORAL DAILY PRN
Status: DISCONTINUED | OUTPATIENT
Start: 2025-01-28 | End: 2025-01-29 | Stop reason: HOSPADM

## 2025-01-28 RX ORDER — PREDNISONE 50 MG/1
50 TABLET ORAL DAILY
Qty: 5 TABLET | Refills: 0 | Status: SHIPPED | OUTPATIENT
Start: 2025-01-28 | End: 2025-01-29 | Stop reason: HOSPADM

## 2025-01-28 RX ORDER — GABAPENTIN 300 MG/1
300 CAPSULE ORAL EVERY EVENING
Status: DISCONTINUED | OUTPATIENT
Start: 2025-01-28 | End: 2025-01-29 | Stop reason: HOSPADM

## 2025-01-28 RX ORDER — ONDANSETRON 2 MG/ML
4 INJECTION INTRAMUSCULAR; INTRAVENOUS EVERY 6 HOURS PRN
Status: DISCONTINUED | OUTPATIENT
Start: 2025-01-28 | End: 2025-01-29 | Stop reason: HOSPADM

## 2025-01-28 RX ORDER — METHOCARBAMOL 500 MG/1
500 TABLET, FILM COATED ORAL 3 TIMES DAILY PRN
Status: DISCONTINUED | OUTPATIENT
Start: 2025-01-28 | End: 2025-01-29 | Stop reason: HOSPADM

## 2025-01-28 RX ORDER — GUAIFENESIN 200 MG/10ML
200 LIQUID ORAL 3 TIMES DAILY PRN
Status: DISCONTINUED | OUTPATIENT
Start: 2025-01-28 | End: 2025-01-29 | Stop reason: HOSPADM

## 2025-01-28 RX ORDER — DEXTROSE MONOHYDRATE 25 G/50ML
25 INJECTION, SOLUTION INTRAVENOUS
Status: DISCONTINUED | OUTPATIENT
Start: 2025-01-28 | End: 2025-01-29 | Stop reason: HOSPADM

## 2025-01-28 RX ORDER — BUDESONIDE, GLYCOPYRROLATE, AND FORMOTEROL FUMARATE 160; 9; 4.8 UG/1; UG/1; UG/1
2 AEROSOL, METERED RESPIRATORY (INHALATION) 2 TIMES DAILY
COMMUNITY

## 2025-01-28 RX ORDER — ACETAMINOPHEN 160 MG/5ML
650 SOLUTION ORAL EVERY 4 HOURS PRN
Status: DISCONTINUED | OUTPATIENT
Start: 2025-01-28 | End: 2025-01-29 | Stop reason: HOSPADM

## 2025-01-28 RX ORDER — SODIUM CHLORIDE 9 MG/ML
40 INJECTION, SOLUTION INTRAVENOUS AS NEEDED
Status: DISCONTINUED | OUTPATIENT
Start: 2025-01-28 | End: 2025-01-29 | Stop reason: HOSPADM

## 2025-01-28 RX ORDER — BISACODYL 5 MG/1
5 TABLET, DELAYED RELEASE ORAL DAILY PRN
Status: DISCONTINUED | OUTPATIENT
Start: 2025-01-28 | End: 2025-01-29 | Stop reason: HOSPADM

## 2025-01-28 RX ORDER — GUAIFENESIN 600 MG/1
600 TABLET, EXTENDED RELEASE ORAL EVERY 12 HOURS SCHEDULED
Status: DISCONTINUED | OUTPATIENT
Start: 2025-01-28 | End: 2025-01-29 | Stop reason: HOSPADM

## 2025-01-28 RX ORDER — BUDESONIDE AND FORMOTEROL FUMARATE DIHYDRATE 160; 4.5 UG/1; UG/1
2 AEROSOL RESPIRATORY (INHALATION)
Status: DISCONTINUED | OUTPATIENT
Start: 2025-01-28 | End: 2025-01-28

## 2025-01-28 RX ORDER — GLUCAGON 1 MG/ML
1 KIT INJECTION
Status: DISCONTINUED | OUTPATIENT
Start: 2025-01-28 | End: 2025-01-29 | Stop reason: HOSPADM

## 2025-01-28 RX ORDER — ACETAMINOPHEN 325 MG/1
650 TABLET ORAL EVERY 4 HOURS PRN
Status: DISCONTINUED | OUTPATIENT
Start: 2025-01-28 | End: 2025-01-29 | Stop reason: HOSPADM

## 2025-01-28 RX ORDER — AMOXICILLIN 250 MG
2 CAPSULE ORAL 2 TIMES DAILY PRN
Status: DISCONTINUED | OUTPATIENT
Start: 2025-01-28 | End: 2025-01-29 | Stop reason: HOSPADM

## 2025-01-28 RX ORDER — SODIUM CHLORIDE 0.9 % (FLUSH) 0.9 %
10 SYRINGE (ML) INJECTION EVERY 12 HOURS SCHEDULED
Status: DISCONTINUED | OUTPATIENT
Start: 2025-01-28 | End: 2025-01-29 | Stop reason: HOSPADM

## 2025-01-28 RX ORDER — DOXYCYCLINE 100 MG/1
100 CAPSULE ORAL ONCE
Status: COMPLETED | OUTPATIENT
Start: 2025-01-28 | End: 2025-01-28

## 2025-01-28 RX ORDER — INSULIN LISPRO 100 [IU]/ML
2-9 INJECTION, SOLUTION INTRAVENOUS; SUBCUTANEOUS
Status: DISCONTINUED | OUTPATIENT
Start: 2025-01-28 | End: 2025-01-29 | Stop reason: HOSPADM

## 2025-01-28 RX ORDER — FLUTICASONE PROPIONATE 50 MCG
1 SPRAY, SUSPENSION (ML) NASAL DAILY
Status: DISCONTINUED | OUTPATIENT
Start: 2025-01-28 | End: 2025-01-29 | Stop reason: HOSPADM

## 2025-01-28 RX ORDER — CETIRIZINE HYDROCHLORIDE 10 MG/1
10 TABLET ORAL DAILY
Status: DISCONTINUED | OUTPATIENT
Start: 2025-01-28 | End: 2025-01-29 | Stop reason: HOSPADM

## 2025-01-28 RX ORDER — METHYLPREDNISOLONE SODIUM SUCCINATE 40 MG/ML
40 INJECTION, POWDER, LYOPHILIZED, FOR SOLUTION INTRAMUSCULAR; INTRAVENOUS EVERY 8 HOURS
Status: DISCONTINUED | OUTPATIENT
Start: 2025-01-28 | End: 2025-01-29 | Stop reason: HOSPADM

## 2025-01-28 RX ORDER — BISACODYL 10 MG
10 SUPPOSITORY, RECTAL RECTAL DAILY PRN
Status: DISCONTINUED | OUTPATIENT
Start: 2025-01-28 | End: 2025-01-29 | Stop reason: HOSPADM

## 2025-01-28 RX ORDER — BUDESONIDE AND FORMOTEROL FUMARATE DIHYDRATE 160; 4.5 UG/1; UG/1
2 AEROSOL RESPIRATORY (INHALATION)
Status: DISCONTINUED | OUTPATIENT
Start: 2025-01-28 | End: 2025-01-29 | Stop reason: HOSPADM

## 2025-01-28 RX ORDER — SODIUM CHLORIDE 0.9 % (FLUSH) 0.9 %
10 SYRINGE (ML) INJECTION AS NEEDED
Status: DISCONTINUED | OUTPATIENT
Start: 2025-01-28 | End: 2025-01-29 | Stop reason: HOSPADM

## 2025-01-28 RX ORDER — IPRATROPIUM BROMIDE AND ALBUTEROL SULFATE 2.5; .5 MG/3ML; MG/3ML
3 SOLUTION RESPIRATORY (INHALATION) EVERY 4 HOURS PRN
Status: DISCONTINUED | OUTPATIENT
Start: 2025-01-28 | End: 2025-01-29 | Stop reason: HOSPADM

## 2025-01-28 RX ORDER — ONDANSETRON 4 MG/1
4 TABLET, ORALLY DISINTEGRATING ORAL EVERY 6 HOURS PRN
Status: DISCONTINUED | OUTPATIENT
Start: 2025-01-28 | End: 2025-01-29 | Stop reason: HOSPADM

## 2025-01-28 RX ORDER — DOXYCYCLINE 100 MG/1
100 CAPSULE ORAL EVERY 12 HOURS SCHEDULED
Status: DISCONTINUED | OUTPATIENT
Start: 2025-01-28 | End: 2025-01-29 | Stop reason: HOSPADM

## 2025-01-28 RX ORDER — LEVOTHYROXINE SODIUM 75 UG/1
75 TABLET ORAL DAILY
Status: DISCONTINUED | OUTPATIENT
Start: 2025-01-28 | End: 2025-01-29 | Stop reason: HOSPADM

## 2025-01-28 RX ORDER — DOXYCYCLINE 100 MG/1
100 CAPSULE ORAL 2 TIMES DAILY
Qty: 20 CAPSULE | Refills: 0 | Status: SHIPPED | OUTPATIENT
Start: 2025-01-28 | End: 2025-01-29 | Stop reason: HOSPADM

## 2025-01-28 RX ORDER — TOPIRAMATE 25 MG/1
100 TABLET, FILM COATED ORAL DAILY
Status: DISCONTINUED | OUTPATIENT
Start: 2025-01-28 | End: 2025-01-29 | Stop reason: HOSPADM

## 2025-01-28 RX ADMIN — PREDNISONE 60 MG: 20 TABLET ORAL at 00:31

## 2025-01-28 RX ADMIN — LEVOTHYROXINE SODIUM 75 MCG: 75 TABLET ORAL at 11:00

## 2025-01-28 RX ADMIN — GUAIFENESIN 200 MG: 100 LIQUID ORAL at 22:21

## 2025-01-28 RX ADMIN — TOPIRAMATE 100 MG: 25 TABLET, FILM COATED ORAL at 11:00

## 2025-01-28 RX ADMIN — Medication 10 ML: at 02:36

## 2025-01-28 RX ADMIN — ACETAMINOPHEN 1000 MG: 500 TABLET ORAL at 00:31

## 2025-01-28 RX ADMIN — METHYLPREDNISOLONE SODIUM SUCCINATE 40 MG: 40 INJECTION, POWDER, FOR SOLUTION INTRAMUSCULAR; INTRAVENOUS at 06:02

## 2025-01-28 RX ADMIN — DOXYCYCLINE 100 MG: 100 CAPSULE ORAL at 00:54

## 2025-01-28 RX ADMIN — IPRATROPIUM BROMIDE AND ALBUTEROL SULFATE 3 ML: .5; 3 SOLUTION RESPIRATORY (INHALATION) at 08:08

## 2025-01-28 RX ADMIN — INSULIN LISPRO 7 UNITS: 100 INJECTION, SOLUTION INTRAVENOUS; SUBCUTANEOUS at 21:49

## 2025-01-28 RX ADMIN — DOXYCYCLINE 100 MG: 100 CAPSULE ORAL at 11:00

## 2025-01-28 RX ADMIN — IPRATROPIUM BROMIDE AND ALBUTEROL SULFATE 3 ML: .5; 3 SOLUTION RESPIRATORY (INHALATION) at 15:42

## 2025-01-28 RX ADMIN — DOXYCYCLINE 100 MG: 100 CAPSULE ORAL at 21:47

## 2025-01-28 RX ADMIN — Medication 10 ML: at 21:51

## 2025-01-28 RX ADMIN — IPRATROPIUM BROMIDE AND ALBUTEROL SULFATE 3 ML: .5; 3 SOLUTION RESPIRATORY (INHALATION) at 02:50

## 2025-01-28 RX ADMIN — METHYLPREDNISOLONE SODIUM SUCCINATE 40 MG: 40 INJECTION, POWDER, FOR SOLUTION INTRAMUSCULAR; INTRAVENOUS at 21:51

## 2025-01-28 RX ADMIN — INSULIN LISPRO 6 UNITS: 100 INJECTION, SOLUTION INTRAVENOUS; SUBCUTANEOUS at 17:59

## 2025-01-28 RX ADMIN — GUAIFENESIN 600 MG: 600 TABLET ORAL at 21:47

## 2025-01-28 RX ADMIN — Medication 10 ML: at 11:00

## 2025-01-28 RX ADMIN — METHOCARBAMOL 500 MG: 500 TABLET ORAL at 02:36

## 2025-01-28 RX ADMIN — METHYLPREDNISOLONE SODIUM SUCCINATE 40 MG: 40 INJECTION, POWDER, FOR SOLUTION INTRAMUSCULAR; INTRAVENOUS at 15:17

## 2025-01-28 RX ADMIN — CETIRIZINE HYDROCHLORIDE 10 MG: 10 TABLET, FILM COATED ORAL at 11:00

## 2025-01-28 RX ADMIN — INSULIN LISPRO 6 UNITS: 100 INJECTION, SOLUTION INTRAVENOUS; SUBCUTANEOUS at 10:55

## 2025-01-28 RX ADMIN — IPRATROPIUM BROMIDE AND ALBUTEROL SULFATE 3 ML: .5; 3 SOLUTION RESPIRATORY (INHALATION) at 20:13

## 2025-01-28 RX ADMIN — METHOCARBAMOL 500 MG: 500 TABLET ORAL at 18:18

## 2025-01-28 RX ADMIN — GABAPENTIN 300 MG: 300 CAPSULE ORAL at 17:59

## 2025-01-28 RX ADMIN — IPRATROPIUM BROMIDE AND ALBUTEROL SULFATE 3 ML: .5; 3 SOLUTION RESPIRATORY (INHALATION) at 11:34

## 2025-01-28 RX ADMIN — GUAIFENESIN 600 MG: 600 TABLET ORAL at 11:14

## 2025-01-28 NOTE — ED PROVIDER NOTES
Subjective   History of Present Illness  This is a 53 yo female w h/o COPD, presents w several days chest tightness and lower chest cramps from frequent coughing. On arrival she is 92% on RA, RR 30, she is not oxygen dep at home, and does have f/u apptm w her pulmonologist next morning, though states her breathing became worse despite using multiple rounds of duoneb at home, as well as being treated approx 1 week ago for sinusitus w a course of azithromycin and steroids. States no sinus issues now, mainly wheezing and coughing. She also has fever of 102, denies any sick contacts, cough is non - productive (thinks her trigger likely due to weather changes), though she refuses viral swab testing.         Review of Systems   All other systems reviewed and are negative.      Past Medical History:   Diagnosis Date    Allergic 1993    Anxiety 2005    Arthritis 1999    Asthma 1993    COPD (chronic obstructive pulmonary disease) 2015    Depression 2005    GERD (gastroesophageal reflux disease) 1994    Headache 2008    Low back pain 1999    Neuromuscular disorder 2007    Obesity 2000       Allergies   Allergen Reactions    Aspirin Anaphylaxis    Azithromycin Anxiety     Shakiness and restless with IV form only, Oral ok       Past Surgical History:   Procedure Laterality Date    SINUS SURGERY  2020    TUBAL ABDOMINAL LIGATION  1993       Family History   Problem Relation Age of Onset    Arthritis Mother     Cancer Mother     Diabetes Mother     Hearing loss Mother     Hyperlipidemia Mother     Miscarriages / Stillbirths Mother     Thyroid disease Mother     Vision loss Mother     Alcohol abuse Father     Asthma Father        Social History     Socioeconomic History    Marital status:    Tobacco Use    Smoking status: Never     Passive exposure: Yes    Smokeless tobacco: Never    Tobacco comments:     only smoked here and there not a consistent smoker. I've quit smoking altogether for over 4 yrs   Vaping Use    Vaping  status: Never Used   Substance and Sexual Activity    Alcohol use: Never    Drug use: Never    Sexual activity: Yes     Partners: Male     Birth control/protection: Tubal ligation           Objective   Physical Exam  Vitals and nursing note reviewed.   Constitutional:       General: She is not in acute distress.     Appearance: Normal appearance. She is not toxic-appearing.   HENT:      Head: Normocephalic.      Nose: Nose normal.      Mouth/Throat:      Mouth: Mucous membranes are moist.   Eyes:      Pupils: Pupils are equal, round, and reactive to light.   Cardiovascular:      Rate and Rhythm: Normal rate and regular rhythm.      Pulses: Normal pulses.      Heart sounds: Normal heart sounds.   Pulmonary:      Effort: Pulmonary effort is normal. No respiratory distress.      Breath sounds: Examination of the right-upper field reveals wheezing. Examination of the left-upper field reveals wheezing. Examination of the right-middle field reveals wheezing. Examination of the left-middle field reveals wheezing. Examination of the right-lower field reveals wheezing. Examination of the left-lower field reveals wheezing. Wheezing present. No rales.   Chest:      Chest wall: No tenderness.   Abdominal:      General: There is no distension.      Palpations: Abdomen is soft.      Tenderness: There is no abdominal tenderness.   Musculoskeletal:         General: No swelling, tenderness or deformity. Normal range of motion.      Cervical back: Normal range of motion.      Right lower leg: No edema.      Left lower leg: No edema.   Skin:     General: Skin is warm and dry.   Neurological:      General: No focal deficit present.      Mental Status: She is alert and oriented to person, place, and time. Mental status is at baseline.   Psychiatric:         Mood and Affect: Mood normal.         Procedures           ED Course  ED Course as of 01/28/25 0152   Mon Jan 27, 2025   2348 XR Chest 1 View [CF]   e Jan 28, 2025   0019 XR Chest  1 View [CF]      ED Course User Index  [CF] Dominik Floyd MD                                                       Medical Decision Making  This is a 53 yo female w history of COPD, RR and SP02 92 on arrival. She was given 2 nebs and steroids, no oxygen and does not wear oxygen at home.   States compared to prior flare ups this seems to be the worse given her wheezing not improving a whole lot.   Standing up she states she would likely not be able to get around at home, and we had discussion about admission to hospital for further respiratory treatments and she felt more comfortable with this.   Her x-ray showed no pneumonia  -no leukocytosis, and Lactic 1.8.     Problems Addressed:  Fever, unspecified fever cause: complicated acute illness or injury  Pulmonary emphysema, unspecified emphysema type: complicated acute illness or injury    Amount and/or Complexity of Data Reviewed  Labs: ordered.     Details: Labs Reviewed  COMPREHENSIVE METABOLIC PANEL - Abnormal; Notable for the following components:     Glucose                       167 (*)                ALT (SGPT)                    54 (*)                 AST (SGOT)                    48 (*)                 Alkaline Phosphatase          149 (*)             All other components within normal limits         Narrative: GFR Categories in Chronic Kidney Disease (CKD)                                      GFR Category          GFR (mL/min/1.73)    Interpretation                  G1                     90 or greater         Normal or high (1)                  G2                      60-89                Mild decrease (1)                  G3a                   45-59                Mild to moderate decrease                  G3b                   30-44                Moderate to severe decrease                  G4                    15-29                Severe decrease                  G5                    14 or less           Kidney failure                     "                      (1)In the absence of evidence of kidney disease, neither GFR category G1 or G2 fulfill the criteria for CKD.                                    eGFR calculation 2021 CKD-EPI creatinine equation, which does not include race as a factor  CBC WITH AUTO DIFFERENTIAL - Abnormal; Notable for the following components:     Lymphocyte %                  11.9 (*)               Immature Grans %              0.7 (*)                Lymphocytes, Absolute         0.53 (*)            All other components within normal limits  LACTIC ACID, PLASMA - Normal  PROCALCITONIN - Normal         Narrative: As a Marker for Sepsis (Non-Neonates):                                    1. <0.5 ng/mL represents a low risk of severe sepsis and/or septic shock.                  2. >2 ng/mL represents a high risk of severe sepsis and/or septic shock.                                    As a Marker for Lower Respiratory Tract Infections that require antibiotic therapy:                                    PCT on Admission    Antibiotic Therapy       6-12 Hrs later                                    >0.5                Strongly Recommended                  >0.25 - <0.5        Recommended                   0.1 - 0.25          Discouraged              Remeasure/reassess PCT                  <0.1                Strongly Discouraged     Remeasure/reassess PCT                                    As 28 day mortality risk marker: \"Change in Procalcitonin Result\" (>80% or <=80%) if Day 0 (or Day 1) and Day 4 values are available. Refer to http://www.[email or web address]-pct-calculator.com                                    Change in PCT <=80%                  A decrease of PCT levels below or equal to 80% defines a positive change in PCT test result representing a higher risk for 28-day all-cause mortality of patients diagnosed with severe sepsis for septic shock.                                    Change in PCT >80%                  A decrease of PCT levels " of more than 80% defines a negative change in PCT result representing a lower risk for 28-day all-cause mortality of patients diagnosed with severe sepsis or septic shock.                     CBC AND DIFFERENTIAL    Radiology: ordered. Decision-making details documented in ED Course.     Details: XR Chest 1 View   Final Result    Impression:    No active disease.                    Electronically Signed: Tim Farris MD      1/28/2025 12:36 AM EST      Workstation ID: [id number]         ECG/medicine tests: ordered.    Risk  OTC drugs.  Prescription drug management.  Decision regarding hospitalization.        Final diagnoses:   Pulmonary emphysema, unspecified emphysema type   Fever, unspecified fever cause       ED Disposition  ED Disposition       ED Disposition   Decision to Admit    Condition   --    Comment   Level of Care: Telemetry [5]   Diagnosis: Dyspnea [372492]   Admitting Physician: FRANKO POPE [[id number]]   Attending Physician: FRANKO POPE [[id number]]                 Mesha Nicholas MD  [address] [address] [phone]    In 1 day           Medication List        New Prescriptions      doxycycline 100 MG capsule  Commonly known as: MONODOX  Take 1 capsule by mouth 2 (Two) Times a Day.     predniSONE 50 MG tablet  Commonly known as: DELTASONE  Take 1 tablet by mouth Daily for 5 days.  Replaces: predniSONE 10 MG (21) dose pack            Changed      hydroCHLOROthiazide 12.5 MG capsule  Commonly known as: MICROZIDE  What changed: additional instructions            Stop      diclofenac 75 MG EC tablet  Commonly known as: VOLTAREN     predniSONE 10 MG (21) dose pack  Commonly known as: DELTASONE  Replaced by: predniSONE 50 MG tablet               Where to Get Your Medications        These medications were sent to [place], [place] - [address] - [phone] [facility] [phone] [address] [address]      Phone: [phone]   doxycycline 100 MG  capsule  predniSONE 50 MG tablet            Dominik Floyd MD  01/28/25 2891

## 2025-01-28 NOTE — PLAN OF CARE
Goal Outcome Evaluation:  Plan of Care Reviewed With: patient        Progress: improving  Outcome Evaluation: VSS, Pt stated she is feeling better, on room air. Slept through out the day. Will continue to monitor.

## 2025-01-28 NOTE — H&P
[facility]     PCP: Mesha Nicholas MD    CODE status: Full     CHIEF COMPLAINT: SOB     HISTORY OF PRESENT ILLNESS:    55 y/o female with hx of asthma, COPD, GERD, and obesity, presents to [facility] for SOB.  She said it started several days ago and worsened last night.  She has been wheezing and felt warm, but did not check her temperature.  She tried using her inhalers at home, which helped a little, but her SOB persisted.  It is worse with exertion.  She denies chest or abdominal pain, nausea, vomiting, LE edema, or diarrhea.  Her  has also had cough and congestion.        Past Medical History:   Diagnosis Date    Allergic 1993    Anxiety 2005    Arthritis 1999    Asthma 1993    COPD (chronic obstructive pulmonary disease) 2015    Depression 2005    GERD (gastroesophageal reflux disease) 1994    Headache 2008    Low back pain 1999    Neuromuscular disorder 2007    Obesity 2000     Past Surgical History:   Procedure Laterality Date    SINUS SURGERY  2020    TUBAL ABDOMINAL LIGATION  1993     Family History   Problem Relation Age of Onset    Arthritis Mother     Cancer Mother     Diabetes Mother     Hearing loss Mother     Hyperlipidemia Mother     Miscarriages / Stillbirths Mother     Thyroid disease Mother     Vision loss Mother     Alcohol abuse Father     Asthma Father      Social History     Tobacco Use    Smoking status: Never     Passive exposure: Yes    Smokeless tobacco: Never    Tobacco comments:     only smoked here and there not a consistent smoker. I've quit smoking altogether for over 4 yrs   Vaping Use    Vaping status: Never Used   Substance Use Topics    Alcohol use: Never    Drug use: Never     (Not in a hospital admission)    Allergies:  Aspirin and Azithromycin    Immunization History   Administered Date(s) Administered    Influenza, Unspecified 10/16/2018, 09/16/2019    Tdap 10/05/2017       REVIEW OF SYSTEMS:  Please see the above history of  "present illness for pertinent positives and negatives.  The remainder of the patient's systems have been reviewed and are negative.     Vital Signs  Temp:  [100.2 °F (37.9 °C)-103.3 °F (39.6 °C)] 100.2 °F (37.9 °C)  Heart Rate:  [] 96  Resp:  [12-30] 22  BP: ()/(67-74) 98/71  Flowsheet Rows      Flowsheet Row First Filed Value   Admission Height 167.6 cm (66\") Documented at 01/27/2025 2251   Admission Weight 127 kg (280 lb) Documented at 01/27/2025 2251               Physical Exam:  General: Patient is lying in bed; on oxygen; increased work of breathing   HENT: Head is atraumatic, normocephalic.   Eyes: Vision is grossly intact. Pupils appear equal and round.   Cardiovascular: RRR S1-S2   Respiratory: Lungs are diminished bilaterally; faint wheezing upper lobes; no rales   Abdominal/GI: Morbidly obese; soft, nontender, bowel sounds present. No rebound or guarding present.   Extremities: No peripheral edema noted.   Musculoskeletal: Spontaneous movement of bilateral upper and lower extremities against gravity noted. No signs of injury or deformity noted.   Skin: Warm and dry.   Psych: Mood and affect are appropriate. Cooperative with exam.   Neuro: No facial asymmetry noted. No focal deficits noted, hearing and vision are grossly intact.           Results Review:    I reviewed the patient's new clinical results.  Lab Results (most recent)       Procedure Component Value Units Date/Time    Procalcitonin [[id number]]  (Normal) Collected: 01/27/25 2259    Specimen: Blood Updated: 01/28/25 0004     Procalcitonin 0.11 ng/mL     Narrative:      As a Marker for Sepsis (Non-Neonates):    1. <0.5 ng/mL represents a low risk of severe sepsis and/or septic shock.  2. >2 ng/mL represents a high risk of severe sepsis and/or septic shock.    As a Marker for Lower Respiratory Tract Infections that require antibiotic therapy:    PCT on Admission    Antibiotic Therapy       6-12 Hrs later    >0.5                Strongly " "Recommended  >0.25 - <0.5        Recommended   0.1 - 0.25          Discouraged              Remeasure/reassess PCT  <0.1                Strongly Discouraged     Remeasure/reassess PCT    As 28 day mortality risk marker: \"Change in Procalcitonin Result\" (>80% or <=80%) if Day 0 (or Day 1) and Day 4 values are available. Refer to http://www.[email or web address]-pct-calculator.com    Change in PCT <=80%  A decrease of PCT levels below or equal to 80% defines a positive change in PCT test result representing a higher risk for 28-day all-cause mortality of patients diagnosed with severe sepsis for septic shock.    Change in PCT >80%  A decrease of PCT levels of more than 80% defines a negative change in PCT result representing a lower risk for 28-day all-cause mortality of patients diagnosed with severe sepsis or septic shock.       Comprehensive Metabolic Panel [[id number]]  (Abnormal) Collected: 01/27/25 2259    Specimen: Blood Updated: 01/27/25 2322     Glucose 167 mg/dL      BUN 10 mg/dL      Creatinine 0.80 mg/dL      Sodium 136 mmol/L      Potassium 4.3 mmol/L      Chloride 101 mmol/L      CO2 23.6 mmol/L      Calcium 9.1 mg/dL      Total Protein 7.1 g/dL      Albumin 4.1 g/dL      ALT (SGPT) 54 U/L      AST (SGOT) 48 U/L      Alkaline Phosphatase 149 U/L      Total Bilirubin 0.5 mg/dL      Globulin 3.0 gm/dL      A/G Ratio 1.4 g/dL      BUN/Creatinine Ratio 12.5     Anion Gap 11.4 mmol/L      eGFR 87.7 mL/min/1.73     Narrative:      GFR Categories in Chronic Kidney Disease (CKD)      GFR Category          GFR (mL/min/1.73)    Interpretation  G1                     90 or greater         Normal or high (1)  G2                      60-89                Mild decrease (1)  G3a                   45-59                Mild to moderate decrease  G3b                   30-44                Moderate to severe decrease  G4                    15-29                Severe decrease  G5                    14 or less           Kidney " failure          (1)In the absence of evidence of kidney disease, neither GFR category G1 or G2 fulfill the criteria for CKD.    eGFR calculation 2021 CKD-EPI creatinine equation, which does not include race as a factor    Lactic Acid, Plasma [[id number]]  (Normal) Collected: 01/27/25 2259    Specimen: Blood Updated: 01/27/25 2320     Lactate 1.9 mmol/L     CBC & Differential [[id number]]  (Abnormal) Collected: 01/27/25 2259    Specimen: Blood Updated: 01/27/25 2309    Narrative:      The following orders were created for panel order CBC & Differential.  Procedure                               Abnormality         Status                     ---------                               -----------         ------                     CBC Auto Differential[[id number]]        Abnormal            Final result                 Please view results for these tests on the individual orders.    CBC Auto Differential [[id number]]  (Abnormal) Collected: 01/27/25 2259    Specimen: Blood Updated: 01/27/25 2309     WBC 4.45 10*3/mm3      RBC 4.83 10*6/mm3      Hemoglobin 14.3 g/dL      Hematocrit 43.0 %      MCV 89.0 fL      MCH 29.6 pg      MCHC 33.3 g/dL      RDW 13.3 %      RDW-SD 43.5 fl      MPV 9.6 fL      Platelets 173 10*3/mm3      Neutrophil % 75.1 %      Lymphocyte % 11.9 %      Monocyte % 10.6 %      Eosinophil % 1.3 %      Basophil % 0.4 %      Immature Grans % 0.7 %      Neutrophils, Absolute 3.34 10*3/mm3      Lymphocytes, Absolute 0.53 10*3/mm3      Monocytes, Absolute 0.47 10*3/mm3      Eosinophils, Absolute 0.06 10*3/mm3      Basophils, Absolute 0.02 10*3/mm3      Immature Grans, Absolute 0.03 10*3/mm3      nRBC 0.0 /100 WBC             Imaging Results (Most Recent)       Procedure Component Value Units Date/Time    XR Chest 1 View [[id number]] Collected: 01/28/25 0036     Updated: 01/28/25 0040    Narrative:      XR CHEST 1 VW    Date of Exam: 1/28/2025 12:23 AM EST    Indication: SOB    Comparison: None  available.    Findings:  There are no airspace consolidations. No pleural fluid. No pneumothorax. The pulmonary vasculature appears within normal limits. The cardiac and mediastinal silhouette appear unremarkable. No acute osseous abnormality identified.      Impression:      Impression:  No active disease.          Electronically Signed: Tim Farris MD    1/28/2025 12:36 AM EST    Workstation ID: [id number]          reviewed    ECG/EMG Results (most recent)       Procedure Component Value Units Date/Time    ECG 12 Lead Dyspnea [[id number]] Collected: 01/27/25 2304     Updated: 01/27/25 2306     QT Interval 310 ms      QTC Interval 407 ms     Narrative:      HEART PUEY=190  bpm  RR Glanbkgd=317  ms  NJ Byqdpoan=064  ms  P Horizontal Axis=-2  deg  P Front Axis=47  deg  QRSD Interval=86  ms  QT Wrrxqldy=017  ms  IStZ=644  ms  QRS Axis=-21  deg  T Wave Axis=60  deg  - OTHERWISE NORMAL ECG -  Sinus tachycardia  Borderline left axis deviation  Date and Time of Study:2025-01-27 23:04:52          reviewed    Assessment & Plan     Acute COPD exacerbation   Likely triggered by virus; patient refused viral swab in ED and upon my request; had temp of 103 in ED  Start IV steroids, scheduled and prn Duonebs, and Doxycycline  Monitor oxygen sats; place oxygen as needed to keep sats >90%  Mucinex, IS, flutter valve    DM II  Patient not currently on meds at home; last hgb A1c 7  Start accuchecks with SSI while on steroids    Obesity   Complicates all aspects of care    DVT ppx:  SCDs                I discussed the patient's findings and my recommendations with patient     Maco Berumen DO  01/28/25  01:32 EST        At [facility], we believe that sharing information builds trust and better relationships. You are receiving this note because you recently visited [facility]. It is possible you will see health information before a provider has talked with you about it. This kind of information can be easy to misunderstand. To  help you fully understand what it means for your health, we urge you to discuss this note with your provider.

## 2025-01-28 NOTE — CASE MANAGEMENT/SOCIAL WORK
Continued Stay Note  RICHARD Whalen     Patient Name: Nalini Nguyễn  MRN: [id number]  Today's Date: 1/28/2025    Admit Date: 1/27/2025    Plan: Plan home with    Discharge Plan       Row Name 01/28/25 1349       Plan    Plan Plan home with     Patient/Family in Agreement with Plan yes    Plan Comments Spoke with patient at bedside. Face sheet verified. Patient lives in a home with her . She is independent of ADLs but does not drive. Her  provides transportation as needed. She use cane, shwr chair and nebulizer as needed.  She has not used HH or rehab services previously.  She does not have a living will. She sees Dr Mesha Nicholas as PCP. She uses [facility] and there are no issues obtaining medications. She will use [facility] retail pharmacy for this admission. CM # placed on white board, will follow for dc needs.                   Discharge Codes    No documentation.                       Kemar Strange RN

## 2025-01-28 NOTE — PLAN OF CARE
Goal Outcome Evaluation:  Plan of Care Reviewed With: patient        Progress: no change  Outcome Evaluation: New ER admit, VSS, room air, rested well

## 2025-01-28 NOTE — PROGRESS NOTES
Non-Billable Note:    Ms. Nguyễn was seen and examined on rounds.  Chloraseptic helping w/ throat discomfort.  Continue nebs and Solumedrol.  No supplemental O2 requirement.

## 2025-01-29 ENCOUNTER — READMISSION MANAGEMENT (OUTPATIENT)
Dept: CALL CENTER | Facility: HOSPITAL | Age: 55
End: 2025-01-29
Payer: MEDICAID

## 2025-01-29 ENCOUNTER — TELEPHONE (OUTPATIENT)
Dept: INTERNAL MEDICINE | Facility: CLINIC | Age: 55
End: 2025-01-29
Payer: MEDICAID

## 2025-01-29 VITALS
RESPIRATION RATE: 20 BRPM | HEART RATE: 78 BPM | BODY MASS INDEX: 43.37 KG/M2 | OXYGEN SATURATION: 95 % | TEMPERATURE: 97.9 F | DIASTOLIC BLOOD PRESSURE: 60 MMHG | HEIGHT: 66 IN | SYSTOLIC BLOOD PRESSURE: 107 MMHG | WEIGHT: 269.84 LBS

## 2025-01-29 LAB
ALBUMIN SERPL-MCNC: 4.1 G/DL (ref 3.5–5.2)
ALBUMIN/GLOB SERPL: 1.3 G/DL
ALP SERPL-CCNC: 132 U/L (ref 39–117)
ALT SERPL W P-5'-P-CCNC: 44 U/L (ref 1–33)
ANION GAP SERPL CALCULATED.3IONS-SCNC: 12.1 MMOL/L (ref 5–15)
AST SERPL-CCNC: 26 U/L (ref 1–32)
BILIRUB SERPL-MCNC: 0.3 MG/DL (ref 0–1.2)
BUN SERPL-MCNC: 18 MG/DL (ref 6–20)
BUN/CREAT SERPL: 24.7 (ref 7–25)
CALCIUM SPEC-SCNC: 9.5 MG/DL (ref 8.6–10.5)
CHLORIDE SERPL-SCNC: 105 MMOL/L (ref 98–107)
CO2 SERPL-SCNC: 19.9 MMOL/L (ref 22–29)
CREAT SERPL-MCNC: 0.73 MG/DL (ref 0.57–1)
EGFRCR SERPLBLD CKD-EPI 2021: 97.9 ML/MIN/1.73
GLOBULIN UR ELPH-MCNC: 3.1 GM/DL
GLUCOSE BLDC GLUCOMTR-MCNC: 262 MG/DL (ref 70–130)
GLUCOSE BLDC GLUCOMTR-MCNC: 384 MG/DL (ref 70–130)
GLUCOSE SERPL-MCNC: 268 MG/DL (ref 65–99)
HBA1C MFR BLD: 7.07 % (ref 4.8–5.6)
POTASSIUM SERPL-SCNC: 4.7 MMOL/L (ref 3.5–5.2)
PROT SERPL-MCNC: 7.2 G/DL (ref 6–8.5)
SODIUM SERPL-SCNC: 137 MMOL/L (ref 136–145)

## 2025-01-29 PROCEDURE — 96376 TX/PRO/DX INJ SAME DRUG ADON: CPT

## 2025-01-29 PROCEDURE — 63710000001 INSULIN LISPRO (HUMAN) PER 5 UNITS: Performed by: FAMILY MEDICINE

## 2025-01-29 PROCEDURE — 94618 PULMONARY STRESS TESTING: CPT

## 2025-01-29 PROCEDURE — 80053 COMPREHEN METABOLIC PANEL: CPT | Performed by: HOSPITALIST

## 2025-01-29 PROCEDURE — 94799 UNLISTED PULMONARY SVC/PX: CPT

## 2025-01-29 PROCEDURE — G0378 HOSPITAL OBSERVATION PER HR: HCPCS

## 2025-01-29 PROCEDURE — 94761 N-INVAS EAR/PLS OXIMETRY MLT: CPT

## 2025-01-29 PROCEDURE — 25010000002 METHYLPREDNISOLONE PER 40 MG: Performed by: FAMILY MEDICINE

## 2025-01-29 PROCEDURE — 82948 REAGENT STRIP/BLOOD GLUCOSE: CPT

## 2025-01-29 PROCEDURE — 94664 DEMO&/EVAL PT USE INHALER: CPT

## 2025-01-29 RX ORDER — PREDNISONE 20 MG/1
TABLET ORAL
Qty: 23 TABLET | Refills: 0 | Status: SHIPPED | OUTPATIENT
Start: 2025-01-29 | End: 2025-02-09

## 2025-01-29 RX ADMIN — IPRATROPIUM BROMIDE AND ALBUTEROL SULFATE 3 ML: .5; 3 SOLUTION RESPIRATORY (INHALATION) at 11:38

## 2025-01-29 RX ADMIN — LEVOTHYROXINE SODIUM 75 MCG: 75 TABLET ORAL at 09:42

## 2025-01-29 RX ADMIN — INSULIN LISPRO 8 UNITS: 100 INJECTION, SOLUTION INTRAVENOUS; SUBCUTANEOUS at 12:47

## 2025-01-29 RX ADMIN — IPRATROPIUM BROMIDE AND ALBUTEROL SULFATE 3 ML: .5; 3 SOLUTION RESPIRATORY (INHALATION) at 07:52

## 2025-01-29 RX ADMIN — INSULIN LISPRO 6 UNITS: 100 INJECTION, SOLUTION INTRAVENOUS; SUBCUTANEOUS at 09:40

## 2025-01-29 RX ADMIN — CETIRIZINE HYDROCHLORIDE 10 MG: 10 TABLET, FILM COATED ORAL at 09:42

## 2025-01-29 RX ADMIN — GUAIFENESIN 200 MG: 100 LIQUID ORAL at 06:04

## 2025-01-29 RX ADMIN — Medication 10 ML: at 09:42

## 2025-01-29 RX ADMIN — METHYLPREDNISOLONE SODIUM SUCCINATE 40 MG: 40 INJECTION, POWDER, FOR SOLUTION INTRAMUSCULAR; INTRAVENOUS at 06:04

## 2025-01-29 RX ADMIN — DOXYCYCLINE 100 MG: 100 CAPSULE ORAL at 09:42

## 2025-01-29 RX ADMIN — TOPIRAMATE 100 MG: 25 TABLET, FILM COATED ORAL at 09:42

## 2025-01-29 NOTE — OUTREACH NOTE
Prep Survey      Flowsheet Row Responses   [facility] facility patient discharged from? [place]   Is LACE score < 7 ? Yes   Eligibility [facility]   Date of Admission 01/27/25   Date of Discharge 01/29/25   Discharge Disposition Home or Self Care   Discharge diagnosis Dypsnea   Does the patient have one of the following disease processes/diagnoses(primary or secondary)? Other   Does the patient have Home health ordered? No   Is there a DME ordered? No   Prep survey completed? Yes            SHENA A - Registered Nurse

## 2025-01-29 NOTE — CASE MANAGEMENT/SOCIAL WORK
Case Management Discharge Note      Final Note: dc home         Selected Continued Care - Discharged on 1/29/2025 Admission date: 1/27/2025 - Discharge disposition: Home or Self Care      Destination    No services have been selected for the patient.                Durable Medical Equipment    No services have been selected for the patient.                Dialysis/Infusion    No services have been selected for the patient.                Home Medical Care    No services have been selected for the patient.                Therapy    No services have been selected for the patient.                Community Resources    No services have been selected for the patient.                Community & DME    No services have been selected for the patient.                         Final Discharge Disposition Code: 01 - home or self-care

## 2025-01-29 NOTE — TELEPHONE ENCOUNTER
She called today about making an appointment with Dr. Nicholas.  I explained once she is discharged from the hospital that day give us a call and we will make her a hospital follow up.  She said she will give us a call

## 2025-01-29 NOTE — PLAN OF CARE
Goal Outcome Evaluation:  Plan of Care Reviewed With: patient        Progress: improving  Outcome Evaluation: VSS, room air, rested well, BG monitored

## 2025-01-29 NOTE — PROGRESS NOTES
Exercise Oximetry    Patient Name:Nalini Nguyễn   MRN: [id number]   Date: 01/29/25             ROOM AIR Baseline at rest   SpO2%  95   Heart Rate  70   Blood Pressure  125/75     Oxygen Baseline at rest   Oxygen (LPM)    SpO2%     Heart Rate     Blood Pressure       EXERCISE  SpO2% HR Supplemental Oxygen, LPM   1 MINUTE 94 70    2 MINUTES 96 100    3 MINUTES 96 93    4 MINUTES 95 100    5 MINUTES 95 93    6 MINUTES 95 96      Recovery       Time to Baseline/Recovery (minutes) 3   SpO2 % 95   HR 76   Oxygen  Room air     Distance Walked (meters) 160 meters       Pre Post   Dyspnea (Jada Scale) 1 3   Fatigue (Jada Scale) 1 3     Comments:

## 2025-01-29 NOTE — DISCHARGE SUMMARY
Nalini Nguyễn  1970  [id number]    Hospitalists Discharge Summary    Date of Admission: 1/27/2025  Date of Discharge:  1/29/2025    Primary Discharge Diagnoses:  AECOPD  Morbidly Obese Body mass index is 43.55 kg/m².    Secondary Discharge Diagnoses:  Diabetes Mellitus, Type 2 in Morbidly Obese A1c of 7%    History of Present Illness (take from H&P):  55 y/o female with hx of asthma, COPD, GERD, and obesity, presents to [facility] for SOB.  She said it started several days ago and worsened last night.  She has been wheezing and felt warm, but did not check her temperature.  She tried using her inhalers at home, which helped a little, but her SOB persisted.  It is worse with exertion.  She denies chest or abdominal pain, nausea, vomiting, LE edema, or diarrhea.  Her  has also had cough and congestion.      Hospital Course:  Ms. Nguyễn was admitted to the medical/surgical floor.  She was continued on nebulized bronchodilators as well as IV steroids.  She declined viral swab, but it was suspected that her symptoms were kicked off by a viral infection.  No supplemental oxygen was needed, and her walking oximetry at the time of discharge noted no need for oxygen either with rest or exertion.    PCP  Patient Care Team:  Mesha Nicholas MD as PCP - General (Internal Medicine)    Consults:   Consults       No orders found from 12/29/2024 to 1/28/2025.            Operations and Procedures Performed:       XR Chest 1 View    Result Date: 1/28/2025  Narrative: XR CHEST 1 VW Date of Exam: 1/28/2025 12:23 AM EST Indication: SOB Comparison: None available. Findings: There are no airspace consolidations. No pleural fluid. No pneumothorax. The pulmonary vasculature appears within normal limits. The cardiac and mediastinal silhouette appear unremarkable. No acute osseous abnormality identified.     Impression: Impression: No active disease. Electronically Signed: Tim Farris MD  1/28/2025 12:36 AM EST   Workstation ID: [id number]     Allergies:  is allergic to aspirin and azithromycin.    Jonathon  reviewed    Discharge Medications:     Discharge Medications        New Medications        Instructions Start Date   phenol 1.4 % liquid liquid  Commonly known as: CHLORASEPTIC   1 spray, Mouth/Throat, Every 2 Hours PRN      predniSONE 20 MG tablet  Commonly known as: DELTASONE  Replaces: predniSONE 10 MG (21) dose pack   Take 2 tablets by mouth 2 (Two) Times a Day for 3 days, THEN 1 tablet 2 (Two) Times a Day for 3 days, THEN 1 tablet Daily for 5 days.   Start Date: January 29, 2025            Changes to Medications        Instructions Start Date   hydroCHLOROthiazide 12.5 MG capsule  Commonly known as: MICROZIDE  What changed: additional instructions   12.5 mg, Daily      Hydrocortisone (Perianal) 1 % cream rectal cream  Commonly known as: Preparation H  What changed:   how much to take  when to take this  reasons to take this   Rectal, Daily      ketoconazole 2 % shampoo  Commonly known as: NIZORAL  What changed: how much to take   Topical, 2 Times Weekly      Sinus Rinse pack  What changed:   how much to take  how to take this  when to take this  reasons to take this   Use 1 time daily when sinuses are flared and bothered.             Continue These Medications        Instructions Start Date   accu-chek safe-t pro lancets   Use once a day for blood sugar checks.      albuterol sulfate  (90 Base) MCG/ACT inhaler  Commonly known as: PROVENTIL HFA;VENTOLIN HFA;PROAIR HFA   2 puffs, Inhalation, Every 6 Hours PRN      Breztri Aerosphere 160-9-4.8 MCG/ACT aerosol inhaler  Generic drug: Budeson-Glycopyrrol-Formoterol   2 puffs, 2 Times Daily      clonazePAM 1 MG tablet  Commonly known as: KlonoPIN   1 tablet, 2 Times Daily PRN      D-Care Glucometer w/Device kit   1 Device, Not Applicable, 3 Times Daily PRN      fexofenadine 60 MG tablet  Commonly known as: ALLEGRA   180 mg, Oral, Daily      fluticasone 50 MCG/ACT nasal  spray  Commonly known as: FLONASE   1 spray, Daily      gabapentin 300 MG capsule  Commonly known as: NEURONTIN   300 mg, Oral, Every Evening      glucose blood test strip   Use once a day for blood sugar checks.      guaifenesin 100 MG/5ML liquid  Commonly known as: ROBITUSSIN   200 mg, Oral, 3 Times Daily PRN      HYDROcodone-acetaminophen  MG per tablet  Commonly known as: NORCO   1 tablet, Oral, 2 Times Daily      ibuprofen 800 MG tablet  Commonly known as: ADVIL,MOTRIN   800 mg, 3 Times Daily PRN      ipratropium-albuterol 0.5-2.5 mg/3 ml nebulizer  Commonly known as: DUO-NEB   Take 3 mL by nebulization 4 (Four) Times a Day.      levothyroxine 75 MCG tablet  Commonly known as: SYNTHROID, LEVOTHROID   75 mcg, Daily      lidocaine 5 %  Commonly known as: LIDODERM   1 patch, Transdermal, Every 24 Hours, Remove & Discard patch within 12 hours or as directed by MD      methocarbamol 500 MG tablet  Commonly known as: ROBAXIN   500 mg, Oral, 3 Times Daily PRN      promethazine-dextromethorphan 6.25-15 MG/5ML syrup  Commonly known as: PROMETHAZINE-DM   5 mL, Oral, Nightly PRN, Use when ill only as discussed.      topiramate 100 MG tablet  Commonly known as: TOPAMAX   100 mg, Daily      triamcinolone 0.5 % ointment  Commonly known as: KENALOG   1 Application, 2 Times Daily      Triamcinolone Acetonide 55 MCG/ACT nasal inhaler  Commonly known as: NASACORT   2 sprays, Nasal, Daily      vitamin D 1.25 MG (00606 UT) capsule capsule  Commonly known as: ERGOCALCIFEROL   50,000 Units, Oral, Every 7 Days             Stop These Medications      diclofenac 75 MG EC tablet  Commonly known as: VOLTAREN     predniSONE 10 MG (21) dose pack  Commonly known as: DELTASONE  Replaced by: predniSONE 20 MG tablet              Last Lab Results:   Lab Results (most recent)       Procedure Component Value Units Date/Time    Comprehensive Metabolic Panel [[id number]]  (Abnormal) Collected: 01/29/25 0809    Specimen: Blood Updated: 01/29/25  0852     Glucose 268 mg/dL      BUN 18 mg/dL      Creatinine 0.73 mg/dL      Sodium 137 mmol/L      Potassium 4.7 mmol/L      Chloride 105 mmol/L      CO2 19.9 mmol/L      Calcium 9.5 mg/dL      Total Protein 7.2 g/dL      Albumin 4.1 g/dL      ALT (SGPT) 44 U/L      AST (SGOT) 26 U/L      Alkaline Phosphatase 132 U/L      Total Bilirubin 0.3 mg/dL      Globulin 3.1 gm/dL      A/G Ratio 1.3 g/dL      BUN/Creatinine Ratio 24.7     Anion Gap 12.1 mmol/L      eGFR 97.9 mL/min/1.73     Narrative:      GFR Categories in Chronic Kidney Disease (CKD)      GFR Category          GFR (mL/min/1.73)    Interpretation  G1                     90 or greater         Normal or high (1)  G2                      60-89                Mild decrease (1)  G3a                   45-59                Mild to moderate decrease  G3b                   30-44                Moderate to severe decrease  G4                    15-29                Severe decrease  G5                    14 or less           Kidney failure          (1)In the absence of evidence of kidney disease, neither GFR category G1 or G2 fulfill the criteria for CKD.    eGFR calculation 2021 CKD-EPI creatinine equation, which does not include race as a factor    POC Glucose Once [[id number]]  (Abnormal) Collected: 01/29/25 0837    Specimen: Blood Updated: 01/29/25 0843     Glucose 262 mg/dL     POC Glucose Once [[id number]]  (Abnormal) Collected: 01/28/25 2126    Specimen: Blood Updated: 01/28/25 2134     Glucose 341 mg/dL     Procalcitonin [[id number]]  (Normal) Collected: 01/28/25 1240    Specimen: Blood Updated: 01/28/25 1319     Procalcitonin 0.09 ng/mL     Narrative:      As a Marker for Sepsis (Non-Neonates):    1. <0.5 ng/mL represents a low risk of severe sepsis and/or septic shock.  2. >2 ng/mL represents a high risk of severe sepsis and/or septic shock.    As a Marker for Lower Respiratory Tract Infections that require antibiotic therapy:    PCT on Admission    Antibiotic  "Therapy       6-12 Hrs later    >0.5                Strongly Recommended  >0.25 - <0.5        Recommended   0.1 - 0.25          Discouraged              Remeasure/reassess PCT  <0.1                Strongly Discouraged     Remeasure/reassess PCT    As 28 day mortality risk marker: \"Change in Procalcitonin Result\" (>80% or <=80%) if Day 0 (or Day 1) and Day 4 values are available. Refer to http://www.Advestigopct-calculator.com    Change in PCT <=80%  A decrease of PCT levels below or equal to 80% defines a positive change in PCT test result representing a higher risk for 28-day all-cause mortality of patients diagnosed with severe sepsis for septic shock.    Change in PCT >80%  A decrease of PCT levels of more than 80% defines a negative change in PCT result representing a lower risk for 28-day all-cause mortality of patients diagnosed with severe sepsis or septic shock.       Procalcitonin [[id number]]  (Normal) Collected: 01/27/25 2259    Specimen: Blood Updated: 01/28/25 0004     Procalcitonin 0.11 ng/mL     Narrative:      As a Marker for Sepsis (Non-Neonates):    1. <0.5 ng/mL represents a low risk of severe sepsis and/or septic shock.  2. >2 ng/mL represents a high risk of severe sepsis and/or septic shock.    As a Marker for Lower Respiratory Tract Infections that require antibiotic therapy:    PCT on Admission    Antibiotic Therapy       6-12 Hrs later    >0.5                Strongly Recommended  >0.25 - <0.5        Recommended   0.1 - 0.25          Discouraged              Remeasure/reassess PCT  <0.1                Strongly Discouraged     Remeasure/reassess PCT    As 28 day mortality risk marker: \"Change in Procalcitonin Result\" (>80% or <=80%) if Day 0 (or Day 1) and Day 4 values are available. Refer to http://www.Advestigopct-calculator.com    Change in PCT <=80%  A decrease of PCT levels below or equal to 80% defines a positive change in PCT test result representing a higher risk for 28-day all-cause " mortality of patients diagnosed with severe sepsis for septic shock.    Change in PCT >80%  A decrease of PCT levels of more than 80% defines a negative change in PCT result representing a lower risk for 28-day all-cause mortality of patients diagnosed with severe sepsis or septic shock.       Comprehensive Metabolic Panel [[id number]]  (Abnormal) Collected: 01/27/25 2259    Specimen: Blood Updated: 01/27/25 2322     Glucose 167 mg/dL      BUN 10 mg/dL      Creatinine 0.80 mg/dL      Sodium 136 mmol/L      Potassium 4.3 mmol/L      Chloride 101 mmol/L      CO2 23.6 mmol/L      Calcium 9.1 mg/dL      Total Protein 7.1 g/dL      Albumin 4.1 g/dL      ALT (SGPT) 54 U/L      AST (SGOT) 48 U/L      Alkaline Phosphatase 149 U/L      Total Bilirubin 0.5 mg/dL      Globulin 3.0 gm/dL      A/G Ratio 1.4 g/dL      BUN/Creatinine Ratio 12.5     Anion Gap 11.4 mmol/L      eGFR 87.7 mL/min/1.73     Narrative:      GFR Categories in Chronic Kidney Disease (CKD)      GFR Category          GFR (mL/min/1.73)    Interpretation  G1                     90 or greater         Normal or high (1)  G2                      60-89                Mild decrease (1)  G3a                   45-59                Mild to moderate decrease  G3b                   30-44                Moderate to severe decrease  G4                    15-29                Severe decrease  G5                    14 or less           Kidney failure          (1)In the absence of evidence of kidney disease, neither GFR category G1 or G2 fulfill the criteria for CKD.    eGFR calculation 2021 CKD-EPI creatinine equation, which does not include race as a factor    Lactic Acid, Plasma [[id number]]  (Normal) Collected: 01/27/25 2259    Specimen: Blood Updated: 01/27/25 2320     Lactate 1.9 mmol/L     CBC & Differential [[id number]]  (Abnormal) Collected: 01/27/25 2259    Specimen: Blood Updated: 01/27/25 2309    Narrative:      The following orders were created for panel order CBC &  Differential.  Procedure                               Abnormality         Status                     ---------                               -----------         ------                     CBC Auto Differential[[id number]]        Abnormal            Final result                 Please view results for these tests on the individual orders.    CBC Auto Differential [[id number]]  (Abnormal) Collected: 01/27/25 2259    Specimen: Blood Updated: 01/27/25 2309     WBC 4.45 10*3/mm3      RBC 4.83 10*6/mm3      Hemoglobin 14.3 g/dL      Hematocrit 43.0 %      MCV 89.0 fL      MCH 29.6 pg      MCHC 33.3 g/dL      RDW 13.3 %      RDW-SD 43.5 fl      MPV 9.6 fL      Platelets 173 10*3/mm3      Neutrophil % 75.1 %      Lymphocyte % 11.9 %      Monocyte % 10.6 %      Eosinophil % 1.3 %      Basophil % 0.4 %      Immature Grans % 0.7 %      Neutrophils, Absolute 3.34 10*3/mm3      Lymphocytes, Absolute 0.53 10*3/mm3      Monocytes, Absolute 0.47 10*3/mm3      Eosinophils, Absolute 0.06 10*3/mm3      Basophils, Absolute 0.02 10*3/mm3      Immature Grans, Absolute 0.03 10*3/mm3      nRBC 0.0 /100 WBC           Imaging Results (Most Recent)       Procedure Component Value Units Date/Time    XR Chest 1 View [[id number]] Collected: 01/28/25 0036     Updated: 01/28/25 0040    Narrative:      XR CHEST 1 VW    Date of Exam: 1/28/2025 12:23 AM EST    Indication: SOB    Comparison: None available.    Findings:  There are no airspace consolidations. No pleural fluid. No pneumothorax. The pulmonary vasculature appears within normal limits. The cardiac and mediastinal silhouette appear unremarkable. No acute osseous abnormality identified.      Impression:      Impression:  No active disease.          Electronically Signed: Tim Farris MD    1/28/2025 12:36 AM EST    Workstation ID: [id number]            PROCEDURES      Condition on Discharge:  Stable    Physical Exam at Discharge  Vital Signs  Temp:  [97.2 °F (36.2 °C)-98.1 °F (36.7 °C)] 97.6 °F  (36.4 °C)  Heart Rate:  [61-84] 68  Resp:  [20-22] 20  BP: (112-125)/(66-80) 125/75    Physical Exam:  Physical Exam   Constitutional: Patient appears in no acute distress   Cardiovascular: Regular rate, regular rhythm, S1 normal and S2 normal.  Exam reveals no gallop and no friction rub.  No murmur heard.  Pulmonary/Chest: Lungs are diminished to auscultation bilaterally. No respiratory distress. No wheezes. No rhonchi. No rales.   Abdominal: Morbidly obese. Soft. Bowel sounds are normal. No distension and no mass. There is no tenderness.   Musculoskeletal: Normal Muscle tone  Extremities: No edema.   Neurological: Patient is alert and oriented to person, place, and time. Cranial nerves II-XII are grossly intact with no focal deficits.    Discharge Disposition  Home    Visiting Nurse:    No     Home PT/OT:  No     Home Safety Evaluation:  No     DME  None    Discharge Diet:      Dietary Orders (From admission, onward)       Start     Ordered    01/28/25 0145  Diet: Regular/House; Fluid Consistency: Thin (IDDSI 0)  Diet Effective Now        References:    Diet Order Definitions   Question Answer Comment   Diets: Regular/House    Fluid Consistency: Thin (IDDSI 0)        01/28/25 0144                    Activity at Discharge:  As tolerated    Follow-up Appointments  Future Appointments   Date Time Provider Department Center   3/12/2025  6:30 PM [facility] HST [facility] SLEEP [facility]   4/7/2025  8:00 AM [facility] OP PFT/EEG ROOM 1 [facility] PFT [facility]   4/8/2025  8:45 AM Mesha Nicholas MD [facility]     Additional Instructions for the Follow-ups that You Need to Schedule       Discharge Follow-up with PCP   As directed       Currently Documented PCP:    Mehsa Nicholas MD    PCP Phone Number:    [phone]     Follow Up Details: 1-2 weeks                Test Results Pending at Discharge       Rick Bartlett MD  01/29/25  11:21 EST    Time: <30 minutes

## 2025-01-30 ENCOUNTER — TRANSITIONAL CARE MANAGEMENT TELEPHONE ENCOUNTER (OUTPATIENT)
Dept: CALL CENTER | Facility: HOSPITAL | Age: 55
End: 2025-01-30
Payer: MEDICAID

## 2025-01-30 NOTE — OUTREACH NOTE
Call Center TCM Note      Flowsheet Row Responses   [facility] patient discharged from? [place]   Does the patient have one of the following disease processes/diagnoses(primary or secondary)? Other   TCM attempt successful? Yes  [vr for [place], spouse as well as children Robina and Kimberly]   Call start time 1431   Call end time 1438   Discharge diagnosis Dypsnea   Meds reviewed with patient/caregiver? Yes   Is the patient having any side effects they believe may be caused by any medication additions or changes? No   Does the patient have all medications ordered at discharge? Yes   Is the patient taking all medications as directed (includes completed medication regime)? Yes   Comments Hospital f/u 2/6/25@1115am   Does the patient have an appointment with their PCP within 7-14 days of discharge? Yes   Has home health visited the patient within 72 hours of discharge? N/A   Psychosocial issues? No   Did the patient receive a copy of their discharge instructions? Yes   Nursing interventions Reviewed instructions with patient   What is the patient's perception of their health status since discharge? Same  [Pt still has a cough, chest tightness and scratchy throat.  Pt had just finished a breathing tx, encouraged use of IS, DB, monitoring sats and alternating rest/activity.  Aware to seek care for worsening s/s.]   Is the patient/caregiver able to teach back signs and symptoms related to disease process for when to call PCP? Yes   Is the patient/caregiver able to teach back signs and symptoms related to disease process for when to call 911? Yes   TCM call completed? Yes   Call end time 1438            Mariya Kulkarni RN    1/30/2025, 14:42 EST

## 2025-02-06 ENCOUNTER — OFFICE VISIT (OUTPATIENT)
Dept: INTERNAL MEDICINE | Facility: CLINIC | Age: 55
End: 2025-02-06
Payer: MEDICAID

## 2025-02-06 VITALS
OXYGEN SATURATION: 98 % | HEART RATE: 71 BPM | TEMPERATURE: 97.8 F | BODY MASS INDEX: 42.78 KG/M2 | DIASTOLIC BLOOD PRESSURE: 80 MMHG | SYSTOLIC BLOOD PRESSURE: 114 MMHG | WEIGHT: 266.2 LBS | HEIGHT: 66 IN

## 2025-02-06 DIAGNOSIS — E11.65 TYPE 2 DIABETES MELLITUS WITH HYPERGLYCEMIA, WITHOUT LONG-TERM CURRENT USE OF INSULIN: Primary | ICD-10-CM

## 2025-02-06 DIAGNOSIS — E03.9 HYPOTHYROIDISM, UNSPECIFIED TYPE: ICD-10-CM

## 2025-02-06 DIAGNOSIS — J44.1 COPD WITH EXACERBATION: ICD-10-CM

## 2025-02-06 PROCEDURE — 1160F RVW MEDS BY RX/DR IN RCRD: CPT | Performed by: INTERNAL MEDICINE

## 2025-02-06 PROCEDURE — 1159F MED LIST DOCD IN RCRD: CPT | Performed by: INTERNAL MEDICINE

## 2025-02-06 PROCEDURE — 3051F HG A1C>EQUAL 7.0%<8.0%: CPT | Performed by: INTERNAL MEDICINE

## 2025-02-06 PROCEDURE — 99214 OFFICE O/P EST MOD 30 MIN: CPT | Performed by: INTERNAL MEDICINE

## 2025-02-06 RX ORDER — GLIPIZIDE 5 MG/1
5 TABLET ORAL DAILY
Qty: 30 TABLET | Refills: 1 | Status: SHIPPED | OUTPATIENT
Start: 2025-02-06

## 2025-02-06 NOTE — PROGRESS NOTES
"Chief Complaint  Hospital Follow Up Visit    Subjective        Nalini Nguyễn presents to [facility] for follow up COPD exacerbation and T2DM medication management.     History of Present Illness    Has had a cough still that is finally starting to improve. Is doing her steroid taper, has 2 more days. Sore throat is improving.     BG has been in the 280-350 fasting. Has been checking after eating 300-450.     For her T2DM, discussed medications she was previously   Ozempic with too many side effects, cramping, HA. Has tried trulicity however she reports that within an hour of taking it she had diarrhea, HA, really bad abdominal cramping.     Objective   Vital Signs:  /80 (BP Location: Left arm, Patient Position: Sitting, Cuff Size: Large Adult)   Pulse 71   Temp 97.8 °F (36.6 °C) (Infrared)   Ht 167.6 cm (66\")   Wt 121 kg (266 lb 3.2 oz)   SpO2 98%   BMI 42.97 kg/m²   Estimated body mass index is 42.97 kg/m² as calculated from the following:    Height as of this encounter: 167.6 cm (66\").    Weight as of this encounter: 121 kg (266 lb 3.2 oz).    Class 3 Severe Obesity (BMI >=40). Obesity-related health conditions include the following: diabetes mellitus. Obesity is improving with treatment. BMI is is above average; BMI management plan is completed. We discussed portion control, increasing exercise, and pharmacologic options including mounjaro .      Physical Exam  Constitutional:       Appearance: Normal appearance.   HENT:      Nose: Nose normal.   Eyes:      Extraocular Movements: Extraocular movements intact.      Conjunctiva/sclera: Conjunctivae normal.   Cardiovascular:      Rate and Rhythm: Normal rate.   Pulmonary:      Effort: Pulmonary effort is normal.   Musculoskeletal:         General: Normal range of motion.   Neurological:      General: No focal deficit present.      Mental Status: She is alert and oriented to person, place, and time.   Psychiatric:         " Mood and Affect: Mood normal.         Behavior: Behavior normal.        Result Review :  The following data was reviewed by: Yana Corcoran on 02/06/2025:  Common labs          10/4/2024    10:27 1/27/2025    22:59 1/29/2025    08:09   Common Labs   Glucose 178  167  268    BUN 11  10  18    Creatinine 0.74  0.80  0.73    Sodium 139  136  137    Potassium 4.8  4.3  4.7    Chloride 102  101  105    Calcium 9.5  9.1  9.5    Total Protein 6.6      Albumin 4.2  4.1  4.1    Total Bilirubin 0.5  0.5  0.3    Alkaline Phosphatase 162  149  132    AST (SGOT) 39  48  26    ALT (SGPT) 43  54  44    WBC 7.69  4.45     Hemoglobin 15.1  14.3     Hematocrit 45.5  43.0     Platelets 240  173     Total Cholesterol 238      Triglycerides 127      HDL Cholesterol 48      LDL Cholesterol  167      Hemoglobin A1C 7.40  7.07       Most Recent A1C          1/27/2025    22:59   HGBA1C Most Recent   Hemoglobin A1C 7.07      Data reviewed : Recent hospitalization notes 1/28-1/29 for COPD exacerbation. Started on oral steroids, received breathing treatments while admitted. Discharged with steroid taper.            Assessment and Plan   Diagnoses and all orders for this visit:    1. Type 2 diabetes mellitus with hyperglycemia, without long-term current use of insulin (Primary)  -     glipizide (GLUCOTROL) 5 MG tablet; Take 1 tablet by mouth Daily.  Dispense: 30 tablet; Refill: 1  -     Tirzepatide 2.5 MG/0.5ML solution auto-injector; Inject 2.5 mg under the skin into the appropriate area as directed 1 (One) Time Per Week.  Dispense: 2 mL; Refill: 0    2. COPD with exacerbation    3. Hypothyroidism, unspecified type  -     TSH; Future  -     T4, free; Future      Will stop oral steroids at this time, only had 2 days left of taper and was extremely hyperglycemic with symptoms including blurry vision, HA. Anisha Campoverde, was told to stop in the hospital while on oral steroids.     Will send for mounjaro today, patient has failed metformin,  ozempic, tulicity. Will start glipizide today 5mg daily to help with BG control while waiting for mounjaro approval. Goal to taper off and only be on single medication.              Follow Up   Return in about 3 months (around 5/6/2025) for f/u COPD, T2DM with glipizide.  Patient was given instructions and counseling regarding her condition or for health maintenance advice. Please see specific information pulled into the AVS if appropriate.       I have seen and examined the patient independently.  Agree with above.     Mesha Nicholas MD  Attending physician  Internal Medicine and Pediatrics

## 2025-02-07 LAB
ALBUMIN/CREAT UR: 4 MG/G CREAT (ref 0–29)
CREAT UR-MCNC: 111.3 MG/DL
MICROALBUMIN UR-MCNC: 4.7 UG/ML

## 2025-02-10 DIAGNOSIS — K64.9 HEMORRHOIDS, UNSPECIFIED HEMORRHOID TYPE: ICD-10-CM

## 2025-02-10 DIAGNOSIS — J44.89 ASTHMA-COPD OVERLAP SYNDROME: ICD-10-CM

## 2025-02-10 DIAGNOSIS — G89.4 CHRONIC PAIN SYNDROME: ICD-10-CM

## 2025-02-10 DIAGNOSIS — J45.41 MODERATE PERSISTENT ASTHMA WITH EXACERBATION: ICD-10-CM

## 2025-02-11 RX ORDER — ERGOCALCIFEROL 1.25 MG/1
50000 CAPSULE, LIQUID FILLED ORAL
Qty: 12 CAPSULE | Refills: 0 | Status: SHIPPED | OUTPATIENT
Start: 2025-02-11

## 2025-02-11 RX ORDER — ALBUTEROL SULFATE 90 UG/1
2 INHALANT RESPIRATORY (INHALATION) EVERY 6 HOURS PRN
Qty: 18 G | Refills: 6 | Status: SHIPPED | OUTPATIENT
Start: 2025-02-11

## 2025-02-11 NOTE — TELEPHONE ENCOUNTER
No checkmarks available    Rx Refill Note  Requested Prescriptions     Pending Prescriptions Disp Refills    gabapentin (NEURONTIN) 300 MG capsule 90 capsule 0     Sig: Take 1 capsule by mouth Every Evening.    Hydrocortisone, Perianal, (Preparation H) 1 % cream rectal cream 26 g 2     Sig: Insert  into the rectum Daily.    Budeson-Glycopyrrol-Formoterol (Breztri Aerosphere) 160-9-4.8 MCG/ACT aerosol inhaler       Sig: Inhale 2 puffs 2 (Two) Times a Day.     Signed Prescriptions Disp Refills    vitamin D (ERGOCALCIFEROL) 1.25 MG (35737 UT) capsule capsule 12 capsule 0     Sig: Take 1 capsule by mouth Every 7 (Seven) Days.     Authorizing Provider: ELENITA CLARK     Ordering User: LAWSON MEEK    albuterol sulfate  (90 Base) MCG/ACT inhaler 18 g 6     Sig: Inhale 2 puffs Every 6 (Six) Hours As Needed for Wheezing or Shortness of Air.     Authorizing Provider: ELENITA CLARK     Ordering User: LAWSON MEEK      Last office visit with prescribing clinician: 2/6/2025   Last telemedicine visit with prescribing clinician: Visit date not found   Next office visit with prescribing clinician: 4/8/2025                         Would you like a call back once the refill request has been completed: [] Yes [] No    If the office needs to give you a call back, can they leave a voicemail: [] Yes [] No    Lawson Meek MA  02/11/25, 09:32 EST

## 2025-02-12 RX ORDER — HYDROCORTISONE 10 MG/G
1 CREAM TOPICAL 2 TIMES DAILY PRN
Qty: 26 G | Refills: 6 | Status: SHIPPED | OUTPATIENT
Start: 2025-02-12

## 2025-02-12 RX ORDER — GABAPENTIN 300 MG/1
300 CAPSULE ORAL EVERY EVENING
Qty: 90 CAPSULE | Refills: 0 | Status: SHIPPED | OUTPATIENT
Start: 2025-02-12

## 2025-02-12 RX ORDER — BUDESONIDE, GLYCOPYRROLATE, AND FORMOTEROL FUMARATE 160; 9; 4.8 UG/1; UG/1; UG/1
2 AEROSOL, METERED RESPIRATORY (INHALATION) 2 TIMES DAILY
Qty: 10.7 G | Refills: 6 | Status: SHIPPED | OUTPATIENT
Start: 2025-02-12

## 2025-03-12 ENCOUNTER — HOSPITAL ENCOUNTER (OUTPATIENT)
Dept: SLEEP MEDICINE | Facility: HOSPITAL | Age: 55
Discharge: HOME | End: 2025-03-12
Admitting: HOSPITALIST
Payer: MEDICAID

## 2025-03-12 DIAGNOSIS — R06.83 SNORING: ICD-10-CM

## 2025-03-12 PROCEDURE — G0399 HOME SLEEP TEST/TYPE 3 PORTA: HCPCS

## 2025-03-21 ENCOUNTER — TELEPHONE (OUTPATIENT)
Dept: INTERNAL MEDICINE | Facility: CLINIC | Age: 55
End: 2025-03-21
Payer: MEDICAID

## 2025-03-21 DIAGNOSIS — J44.89 ASTHMA-COPD OVERLAP SYNDROME: Primary | ICD-10-CM

## 2025-03-21 DIAGNOSIS — E11.65 TYPE 2 DIABETES MELLITUS WITH HYPERGLYCEMIA, WITHOUT LONG-TERM CURRENT USE OF INSULIN: ICD-10-CM

## 2025-03-21 RX ORDER — PREDNISONE 10 MG/1
10 TABLET ORAL DAILY
Qty: 5 TABLET | Refills: 0 | Status: SHIPPED | OUTPATIENT
Start: 2025-03-21

## 2025-03-21 RX ORDER — AMOXICILLIN 875 MG/1
875 TABLET, COATED ORAL 2 TIMES DAILY
Qty: 10 TABLET | Refills: 0 | Status: SHIPPED | OUTPATIENT
Start: 2025-03-21

## 2025-03-21 RX ORDER — LANCETS 23 GAUGE
EACH MISCELLANEOUS
Qty: 200 EACH | Refills: 12 | Status: SHIPPED | OUTPATIENT
Start: 2025-03-21

## 2025-03-21 NOTE — TELEPHONE ENCOUNTER
Rx Refill Note  Requested Prescriptions     Pending Prescriptions Disp Refills    Mounjaro 2.5 MG/0.5ML solution auto-injector [Pharmacy Med Name: MOUNJARO 2.500 MG/0.5ML INJECTION] 2 mL 0     Sig: INJECT 2.5 MG UNDER THE SKIN INTO THE APPROPRIATE AREA AS DIRECTED 1 (ONE) TIME PER WEEK.      Last office visit with prescribing clinician: 2/6/2025   Last telemedicine visit with prescribing clinician: Visit date not found   Next office visit with prescribing clinician: 4/8/2025                         Would you like a call back once the refill request has been completed: [] Yes [] No    If the office needs to give you a call back, can they leave a voicemail: [] Yes [] No    Riddhi Montelongo MA  03/21/25, 12:59 EDT

## 2025-03-21 NOTE — TELEPHONE ENCOUNTER
Ok to keep at dose?     Rx Refill Note  Requested Prescriptions     Pending Prescriptions Disp Refills    Mounjaro 2.5 MG/0.5ML solution auto-injector [Pharmacy Med Name: MOUNJARO 2.500 MG/0.5ML INJECTION] 2 mL 0     Sig: INJECT 2.5 MG UNDER THE SKIN INTO THE APPROPRIATE AREA AS DIRECTED 1 (ONE) TIME PER WEEK.      Last office visit with prescribing clinician: 2/6/2025   Last telemedicine visit with prescribing clinician: Visit date not found   Next office visit with prescribing clinician: 3/21/2025                         Would you like a call back once the refill request has been completed: [] Yes [] No    If the office needs to give you a call back, can they leave a voicemail: [] Yes [] No    Tree Meek MA  03/21/25, 12:59 EDT

## 2025-03-21 NOTE — TELEPHONE ENCOUNTER
Head pressure, throat feels raw, bilateral ear popping L>R, clear mucus x 3 days - No OTC - only throat spray     [facility] [place]       Patient is requesting steroids and an antibiotic

## 2025-03-21 NOTE — TELEPHONE ENCOUNTER
She said she has a sinus infection and can not come in today she does not have a ride.  She wanted to know if Dr. Nicholas will call her in something.  I told her I would send a message.  I did advise Urgent Care but she will wait to see what Dr. Nicholas says.

## 2025-04-04 DIAGNOSIS — E11.65 TYPE 2 DIABETES MELLITUS WITH HYPERGLYCEMIA, WITHOUT LONG-TERM CURRENT USE OF INSULIN: Primary | ICD-10-CM

## 2025-04-04 RX ORDER — BLOOD-GLUCOSE METER
KIT MISCELLANEOUS
Qty: 1 EACH | Refills: 0 | Status: SHIPPED | OUTPATIENT
Start: 2025-04-04

## 2025-04-07 ENCOUNTER — HOSPITAL ENCOUNTER (OUTPATIENT)
Dept: PULMONOLOGY | Facility: HOSPITAL | Age: 55
Discharge: HOME | End: 2025-04-07
Admitting: HOSPITALIST
Payer: MEDICAID

## 2025-04-07 VITALS — OXYGEN SATURATION: 97 % | HEART RATE: 73 BPM | RESPIRATION RATE: 16 BRPM

## 2025-04-07 DIAGNOSIS — J45.909 ASTHMA, UNSPECIFIED ASTHMA SEVERITY, UNSPECIFIED WHETHER COMPLICATED, UNSPECIFIED WHETHER PERSISTENT: ICD-10-CM

## 2025-04-07 LAB
BDY SITE: NORMAL
HGB BLDA-MCNC: 14.9 G/DL (ref 13.5–17.5)
Lab: NORMAL

## 2025-04-07 PROCEDURE — 94729 DIFFUSING CAPACITY: CPT

## 2025-04-07 PROCEDURE — 94060 EVALUATION OF WHEEZING: CPT

## 2025-04-07 PROCEDURE — 94726 PLETHYSMOGRAPHY LUNG VOLUMES: CPT

## 2025-04-07 PROCEDURE — 82820 HEMOGLOBIN-OXYGEN AFFINITY: CPT

## 2025-04-07 RX ORDER — ALBUTEROL SULFATE 0.83 MG/ML
2.5 SOLUTION RESPIRATORY (INHALATION) ONCE
Status: COMPLETED | OUTPATIENT
Start: 2025-04-07 | End: 2025-04-07

## 2025-04-07 RX ADMIN — ALBUTEROL SULFATE 2.5 MG: 2.5 SOLUTION RESPIRATORY (INHALATION) at 08:37

## 2025-04-08 ENCOUNTER — OFFICE VISIT (OUTPATIENT)
Dept: INTERNAL MEDICINE | Facility: CLINIC | Age: 55
End: 2025-04-08
Payer: MEDICAID

## 2025-04-08 VITALS
HEIGHT: 66 IN | HEART RATE: 69 BPM | TEMPERATURE: 97.7 F | RESPIRATION RATE: 18 BRPM | SYSTOLIC BLOOD PRESSURE: 122 MMHG | BODY MASS INDEX: 42.3 KG/M2 | WEIGHT: 263.2 LBS | DIASTOLIC BLOOD PRESSURE: 88 MMHG | OXYGEN SATURATION: 97 %

## 2025-04-08 DIAGNOSIS — Z12.11 ENCOUNTER FOR SCREENING FOR MALIGNANT NEOPLASM OF COLON: ICD-10-CM

## 2025-04-08 DIAGNOSIS — E11.65 TYPE 2 DIABETES MELLITUS WITH HYPERGLYCEMIA, WITHOUT LONG-TERM CURRENT USE OF INSULIN: Primary | ICD-10-CM

## 2025-04-08 DIAGNOSIS — E78.2 MIXED HYPERLIPIDEMIA: ICD-10-CM

## 2025-04-08 DIAGNOSIS — Z86.39 HISTORY OF HYPOTHYROIDISM: ICD-10-CM

## 2025-04-08 DIAGNOSIS — J44.89 ASTHMA-COPD OVERLAP SYNDROME: ICD-10-CM

## 2025-04-08 LAB
ALBUMIN SERPL-MCNC: 3.9 G/DL (ref 3.5–5.2)
ALBUMIN/GLOB SERPL: 1.6 G/DL
ALP SERPL-CCNC: 147 U/L (ref 39–117)
ALT SERPL-CCNC: 23 U/L (ref 1–33)
AST SERPL-CCNC: 24 U/L (ref 1–32)
BILIRUB SERPL-MCNC: 0.5 MG/DL (ref 0–1.2)
BUN SERPL-MCNC: 9 MG/DL (ref 6–20)
BUN/CREAT SERPL: 10.3 (ref 7–25)
CALCIUM SERPL-MCNC: 9.4 MG/DL (ref 8.6–10.5)
CHLORIDE SERPL-SCNC: 107 MMOL/L (ref 98–107)
CHOLEST SERPL-MCNC: 196 MG/DL (ref 0–200)
CO2 SERPL-SCNC: 25.9 MMOL/L (ref 22–29)
CREAT SERPL-MCNC: 0.87 MG/DL (ref 0.57–1)
EGFRCR SERPLBLD CKD-EPI 2021: 79.3 ML/MIN/1.73
ERYTHROCYTE [DISTWIDTH] IN BLOOD BY AUTOMATED COUNT: 12.6 % (ref 12.3–15.4)
GLOBULIN SER CALC-MCNC: 2.5 GM/DL
GLUCOSE SERPL-MCNC: 91 MG/DL (ref 65–99)
HBA1C MFR BLD: 7.3 % (ref 4.8–5.6)
HCT VFR BLD AUTO: 41.1 % (ref 34–46.6)
HDLC SERPL-MCNC: 43 MG/DL (ref 40–60)
HGB BLD-MCNC: 13.9 G/DL (ref 12–15.9)
LDLC SERPL CALC-MCNC: 135 MG/DL (ref 0–100)
LDLC/HDLC SERPL: 3.1 {RATIO}
MCH RBC QN AUTO: 29.5 PG (ref 26.6–33)
MCHC RBC AUTO-ENTMCNC: 33.8 G/DL (ref 31.5–35.7)
MCV RBC AUTO: 87.3 FL (ref 79–97)
PLATELET # BLD AUTO: 278 10*3/MM3 (ref 140–450)
POTASSIUM SERPL-SCNC: 4.1 MMOL/L (ref 3.5–5.2)
PROT SERPL-MCNC: 6.4 G/DL (ref 6–8.5)
RBC # BLD AUTO: 4.71 10*6/MM3 (ref 3.77–5.28)
SODIUM SERPL-SCNC: 143 MMOL/L (ref 136–145)
TRIGL SERPL-MCNC: 98 MG/DL (ref 0–150)
TSH SERPL DL<=0.005 MIU/L-ACNC: 3.47 UIU/ML (ref 0.27–4.2)
VLDLC SERPL CALC-MCNC: 18 MG/DL (ref 5–40)
WBC # BLD AUTO: 7.82 10*3/MM3 (ref 3.4–10.8)

## 2025-04-08 PROCEDURE — 1159F MED LIST DOCD IN RCRD: CPT | Performed by: INTERNAL MEDICINE

## 2025-04-08 PROCEDURE — 1160F RVW MEDS BY RX/DR IN RCRD: CPT | Performed by: INTERNAL MEDICINE

## 2025-04-08 PROCEDURE — 99214 OFFICE O/P EST MOD 30 MIN: CPT | Performed by: INTERNAL MEDICINE

## 2025-04-08 PROCEDURE — 3051F HG A1C>EQUAL 7.0%<8.0%: CPT | Performed by: INTERNAL MEDICINE

## 2025-04-08 RX ORDER — FLUTICASONE PROPIONATE AND SALMETEROL 250; 50 UG/1; UG/1
2 POWDER RESPIRATORY (INHALATION) DAILY
COMMUNITY

## 2025-04-08 NOTE — PROGRESS NOTES
"Chief Complaint  COPD (6 MONTH FOLLOW UP)    Subjective        Nalini Nguyễn presents to [facility]  COPD      Has not been able to get Breztri, but is using Advair regularly, but is using Albuterol multiple times per week.    Mounjaro going well with no AE.  Fasting blood sugars .     HTN well controlled today.    Agreeable to Cologuard today.     Objective   Vital Signs:  /88 (BP Location: Left arm, Patient Position: Sitting, Cuff Size: Large Adult)   Pulse 69   Temp 97.7 °F (36.5 °C) (Infrared)   Resp 18   Ht 167.6 cm (66\")   Wt 119 kg (263 lb 3.2 oz)   SpO2 97%   BMI 42.48 kg/m²   Estimated body mass index is 42.48 kg/m² as calculated from the following:    Height as of this encounter: 167.6 cm (66\").    Weight as of this encounter: 119 kg (263 lb 3.2 oz).            Physical Exam  Vitals reviewed.   Constitutional:       General: She is not in acute distress.     Appearance: Normal appearance.   HENT:      Head: Normocephalic and atraumatic.      Nose: Nose normal.      Mouth/Throat:      Mouth: Mucous membranes are moist.   Eyes:      Conjunctiva/sclera: Conjunctivae normal.   Pulmonary:      Effort: Pulmonary effort is normal.   Skin:     General: Skin is warm and dry.   Neurological:      General: No focal deficit present.      Mental Status: She is alert.   Psychiatric:         Mood and Affect: Mood normal.        Result Review :           Assessment and Plan   Diagnoses and all orders for this visit:    1. Type 2 diabetes mellitus with hyperglycemia, without long-term current use of insulin (Primary)  -     Hemoglobin A1c  -     Tirzepatide 5 MG/0.5ML solution auto-injector; Inject 5 mg under the skin into the appropriate area as directed 1 (One) Time Per Week.  Dispense: 6 mL; Refill: 1    2. Encounter for screening for malignant neoplasm of colon    3. Mixed hyperlipidemia  -     Lipid Panel With LDL/HDL Ratio    4. Asthma-COPD overlap syndrome  -     " Comprehensive Metabolic Panel  -     CBC (No Diff)  -     Umeclidinium-Vilanterol (ANORO ELLIPTA) 62.5-25 MCG/ACT aerosol powder  inhaler; Inhale 1 puff Daily.  Dispense: 60 each; Refill: 3    5. History of hypothyroidism  -     TSH    Other orders  -     Lipid Panel With LDL / HDL Ratio      Has cologuard at home.  Will plan to complete.     Labs today    Send anoro ellipta as advair not fully effective.  Would love to be able to use breztri, but will move step wise.     Diabetes much better controlled on mounjaro, continue.          Follow Up   Return in about 3 months (around 7/8/2025) for f/u T2DM.  Patient was given instructions and counseling regarding her condition or for health maintenance advice. Please see specific information pulled into the AVS if appropriate.

## 2025-04-23 DIAGNOSIS — G89.4 CHRONIC PAIN SYNDROME: ICD-10-CM

## 2025-04-28 RX ORDER — GABAPENTIN 300 MG/1
300 CAPSULE ORAL EVERY EVENING
Qty: 90 CAPSULE | Refills: 0 | Status: SHIPPED | OUTPATIENT
Start: 2025-04-28

## 2025-04-28 NOTE — TELEPHONE ENCOUNTER
No Benzodiazepines on Active Med List    Medication not refilled in past 28 days   Rx Refill Note  Requested Prescriptions     Pending Prescriptions Disp Refills    gabapentin (NEURONTIN) 300 MG capsule [Pharmacy Med Name: GABAPENTIN 300 MG CAPSULE] 90 capsule 0     Sig: Take 1 capsule by mouth Every Evening.      Last office visit with prescribing clinician: 4/8/2025   Last telemedicine visit with prescribing clinician: Visit date not found   Next office visit with prescribing clinician: 7/15/2025                         Would you like a call back once the refill request has been completed: [] Yes [] No    If the office needs to give you a call back, can they leave a voicemail: [] Yes [] No    Tree Meek MA  04/28/25, 07:56 EDT     [facility]  Hematology/Oncology Virtual Follow Up Note    Abbi Coreas   1977   [id number]       03/18/25    Reason for Visit:  Follow-up for breast cancer on tamoxifen.  Chief Complaint   Patient presents with    Video Visit     Start Time: 11:05 AM  End Time:   History of Present Illness:   Abbi Coreas is a 47 year old female seen today for breast cancer.     Screening mammogram and US on 10/16/20 showed 0,.6 cm round lesion in right breast was indeterminate.     US breast diagnostic on 10/20/20 showed the new 7 mm x 6 mm x 7 mm taller than wide oval mass in the right breast was at a high suspicion for malignancy.      PT underwent biopsy of right breast mass on 11/3/20 which showed malignant invasive ductal carcinoma with tubular features and rare micro calcifications, grade 1, present in multiple core fragments, longest tumor core 0.5 cm. Ductal carcinoma in situ, nuclear grade 2, cribriform type, minor component. ER positive 90%, AK positive 95%, and Ki-67 12%.     Pt underwent bilateral mastectomy and sentinel lymph node biopsy on 12/18/20. Surgical pathology showed right breast infiltrating ductal carcinoma, grade 1 of 3, measured 0.9 cm in greatest dimension and 0.6 cm from the anterior surgical resection margin. DCIS nuclear grade 2 of 3, associated with infiltrating carcinoma. Lymph nodes negative for carcinoma.     Mammaprint low risk. Mammo print in November.      Started tamoxifen in February 2021.     MMG on 09/17/24 was benign.     Date of Service March 18th, 2025:  Abbi appears today for follow up. Her vision is fine and she believes previous vision changes were due to use of retinol face cream. She has now restarted tamoxifen and has not had any changes since restarting. Hot flashes are tolerable.        Review of Systems   All other systems reviewed and are negative.               Objective:   Oncology Encounter Vitals   ONC OP Encounter  Vitals Group      BP       Pulse       Resp       Temp       Temp src       SpO2       Weight       Height       Pain Score       Pain Location       Pain Education?       BSA (Calculated - m2) - [facility] [employer] [facility]       BSA (Calculated - sq m)       BMI (Calculated)       ECOG [03/18/25 1648]   ECOG Performance Status 0                 Office Visit on 02/04/2025   Component Date Value Ref Range Status    VIA MED ONC PATHWAYS TAG 02/09/2025 [id number]   Final              Imaging studies were reviewed with the following pertinent positives: n/a     ASSESSMENT AND PLAN:          1. Infiltrating ductal carcinoma of the right breast. Grade 1. Stage IA, T1b. [place] lymph nodes negative. Mammaprint low. ER/MI positive. Her-2 negative. Ki-67 12%. Underwent bilateral mastectomy.  Patient started tamoxifen February 1, 2021. Tamoxifen held for 6 weeks on 02/04/25 for vision changes.  2. DCIS nuclear grade 2, associated with infiltrating carcinoma     Patient continues on tamoxifen for low-risk IDC and low Mammaprint score and will plan to continue through February 2031. She previously held for 6 weeks due to significant vision changes, but vision changes, redness and irritation resolved after stopping use of retinol face cream and have not returned since restarting tamoxifen, so I do not believe vision changes are related to tamoxifen.  Toxicity:  Hot flashes: Tolerable. Continue to monitor.  Arthralgias and myalgias: No arthralgias  Dry eyes: Encouraged her to continue using eye drops and inquire with ophthalmologist if she should add natural tears. At 02/04/25 visit, she reported significant vision changes including pink eye, burning sensation, tearing and headaches behind the eyes that we held tamoxifen for. Consultation with ophthalmology identified \"bumps\" on corneas that may be keratitis given her hx. May also be due to contact irritation. Vision changes improved after stopping retinol face cream and have not worsened since  restarting tamoxifen so likely not related to tamoxifen, but she should continue to follow closely with ophthalmlology given the potential for vision changes.  Dry mouth: denies.  Vaginal dryness/decreased libido:She should continue to use lubrication for this  She follows routinely with gyne (next in April)  Bone health.  Pt on tamoxifen which is not expected to adversely affect bone health. Patient reminded to continue to exercise against gravity.  Patient reminded to continue appropriate vitamin D supplementation.  MMG screening done in Sept unremarkable. Repeat in 1 year  Patient taking nightly magnesium supplement which has helped significantly with cramping and leg/foot pain at night  Will keep up with our routine follow-up in June 2025.     RTC June 2025 for MD     On 03/18/2025, IChristopher, scribed the services personally performed by Marizol Ron MD      The documentation recorded by the scribe accurately and completely reflects the service(s) I personally performed and the decisions made by me.     Recent PHQ 2/9 Score    PHQ 2:  PHQ 2 Score Adult PHQ 2 Score Adult PHQ 2 Interpretation Little interest or pleasure in activity?   2/4/2025  11:19 AM 0 No further screening needed 0       PHQ 9:       Distress Score   Distress Management [employer]      Distress Scale (0-10)         This visit is being performed virtually via Video visit using [employer].   Clinical Location: [facility] Go's location Home and is physically present in   the [facility] at the time of this visit.

## 2025-05-05 ENCOUNTER — DOCUMENTATION (OUTPATIENT)
Dept: SLEEP MEDICINE | Facility: HOSPITAL | Age: 55
End: 2025-05-05
Payer: MEDICAID

## 2025-05-05 NOTE — PROGRESS NOTES
Pt was geven sleep study results ovr the phgone.  Pt vrblized that she understood.  New device and supply order has been sent over to [facility].

## 2025-05-08 DIAGNOSIS — K64.9 HEMORRHOIDS, UNSPECIFIED HEMORRHOID TYPE: ICD-10-CM

## 2025-05-08 DIAGNOSIS — E11.65 TYPE 2 DIABETES MELLITUS WITH HYPERGLYCEMIA, WITHOUT LONG-TERM CURRENT USE OF INSULIN: ICD-10-CM

## 2025-05-08 DIAGNOSIS — M62.830 PARASPINAL MUSCLE SPASM: ICD-10-CM

## 2025-05-13 ENCOUNTER — PATIENT MESSAGE (OUTPATIENT)
Dept: INTERNAL MEDICINE | Facility: CLINIC | Age: 55
End: 2025-05-13
Payer: MEDICAID

## 2025-05-13 DIAGNOSIS — J44.1 COPD WITH EXACERBATION: ICD-10-CM

## 2025-05-13 DIAGNOSIS — J44.89 ASTHMA-COPD OVERLAP SYNDROME: Primary | ICD-10-CM

## 2025-05-13 RX ORDER — KETOCONAZOLE 20 MG/ML
SHAMPOO, SUSPENSION TOPICAL 2 TIMES WEEKLY
Qty: 120 ML | Refills: 0 | OUTPATIENT
Start: 2025-05-15

## 2025-05-13 RX ORDER — LANCETS 23 GAUGE
EACH MISCELLANEOUS
Qty: 200 EACH | Refills: 12 | Status: SHIPPED | OUTPATIENT
Start: 2025-05-13

## 2025-05-13 RX ORDER — FEXOFENADINE HCL 60 MG/1
180 TABLET, FILM COATED ORAL DAILY
Qty: 90 TABLET | Refills: 2 | Status: SHIPPED | OUTPATIENT
Start: 2025-05-13 | End: 2025-08-11

## 2025-05-13 RX ORDER — TOPIRAMATE 100 MG/1
100 TABLET, FILM COATED ORAL DAILY
Qty: 90 TABLET | Refills: 2 | Status: SHIPPED | OUTPATIENT
Start: 2025-05-13 | End: 2026-08-21

## 2025-05-13 RX ORDER — LIDOCAINE 50 MG/G
1 PATCH TOPICAL EVERY 24 HOURS
Qty: 30 EACH | Refills: 1 | Status: SHIPPED | OUTPATIENT
Start: 2025-05-13

## 2025-05-13 RX ORDER — ERGOCALCIFEROL 1.25 MG/1
50000 CAPSULE, LIQUID FILLED ORAL
Qty: 12 CAPSULE | Refills: 0 | Status: SHIPPED | OUTPATIENT
Start: 2025-05-13

## 2025-05-13 RX ORDER — IBUPROFEN 800 MG/1
800 TABLET, FILM COATED ORAL 3 TIMES DAILY PRN
OUTPATIENT
Start: 2025-05-13

## 2025-05-13 RX ORDER — HYDROCORTISONE 10 MG/G
1 CREAM TOPICAL 2 TIMES DAILY PRN
Qty: 26 G | Refills: 6 | Status: SHIPPED | OUTPATIENT
Start: 2025-05-13

## 2025-05-16 ENCOUNTER — TELEPHONE (OUTPATIENT)
Dept: INTERNAL MEDICINE | Facility: CLINIC | Age: 55
End: 2025-05-16

## 2025-05-16 NOTE — TELEPHONE ENCOUNTER
Caller: Nalini Nguyễn    Relationship: Self    Best call back number: [phone]     What was the call regarding: PATIENT STATES THAT SHE PUT A REQUEST IN FOR PRESCRIPTIONS FOR ANTIBIOTICS AND STEROIDS TO TREAT A SINUS INFECTION. SHE GOT A RESPONSE SAYING SHE WOULD NEED TO COME IN FOR AN APPOINTMENT, BUT SHE SAID THAT DR CLARK USUALLY CALLS THE PRESCRIPTIONS IN WITHOUT NEEDING AN APPOINTMENT SINCE IT IS A FREQUENT OCCURRENCE.

## 2025-05-19 RX ORDER — AZITHROMYCIN 250 MG/1
TABLET, FILM COATED ORAL
Qty: 6 TABLET | Refills: 0 | Status: SHIPPED | OUTPATIENT
Start: 2025-05-19

## 2025-05-19 RX ORDER — PREDNISONE 10 MG/1
10 TABLET ORAL DAILY
Qty: 5 TABLET | Refills: 0 | Status: SHIPPED | OUTPATIENT
Start: 2025-05-19

## 2025-06-17 DIAGNOSIS — E11.65 TYPE 2 DIABETES MELLITUS WITH HYPERGLYCEMIA, WITHOUT LONG-TERM CURRENT USE OF INSULIN: ICD-10-CM

## 2025-06-17 DIAGNOSIS — G89.4 CHRONIC PAIN SYNDROME: ICD-10-CM

## 2025-06-17 DIAGNOSIS — M62.830 PARASPINAL MUSCLE SPASM: ICD-10-CM

## 2025-06-17 NOTE — TELEPHONE ENCOUNTER
Please review. Some meds do not have checkmarks, from historical provider and/or need MD approval.   Rx Refill Note  Requested Prescriptions     Pending Prescriptions Disp Refills    ibuprofen (ADVIL,MOTRIN) 800 MG tablet       Sig: Take 1 tablet by mouth 3 (Three) Times a Day As Needed for Mild Pain or Moderate Pain.    Triamcinolone Acetonide (NASACORT) 55 MCG/ACT nasal inhaler 16.5 g 6     Sig: Administer 2 sprays into the nostril(s) as directed by provider Daily.    ketoconazole (NIZORAL) 2 % shampoo 120 mL 0     Sig: Apply  topically to the appropriate area as directed 2 (Two) Times a Week.    HYDROcodone-acetaminophen (NORCO)  MG per tablet 60 tablet 0     Sig: Take 1 tablet by mouth 2 (Two) Times a Day.    Tirzepatide 5 MG/0.5ML solution auto-injector 6 mL 1     Sig: Inject 5 mg under the skin into the appropriate area as directed 1 (One) Time Per Week.    gabapentin (NEURONTIN) 300 MG capsule 90 capsule 0     Sig: Take 1 capsule by mouth Every Evening.    vitamin D (ERGOCALCIFEROL) 1.25 MG (41319 UT) capsule capsule 12 capsule 0     Sig: Take 1 capsule by mouth Every 7 (Seven) Days.      Last office visit with prescribing clinician: 4/8/2025   Last telemedicine visit with prescribing clinician: Visit date not found   Next office visit with prescribing clinician: 10/15/2025                         Would you like a call back once the refill request has been completed: [] Yes [] No    If the office needs to give you a call back, can they leave a voicemail: [] Yes [] No    Tree Meek MA  06/17/25, 11:53 EDT

## 2025-06-18 RX ORDER — ERGOCALCIFEROL 1.25 MG/1
50000 CAPSULE, LIQUID FILLED ORAL
Qty: 12 CAPSULE | Refills: 0 | Status: SHIPPED | OUTPATIENT
Start: 2025-06-18

## 2025-06-18 RX ORDER — KETOCONAZOLE 20 MG/ML
SHAMPOO, SUSPENSION TOPICAL 2 TIMES WEEKLY
Qty: 120 ML | Refills: 6 | Status: SHIPPED | OUTPATIENT
Start: 2025-06-19

## 2025-06-18 RX ORDER — TRIAMCINOLONE ACETONIDE 55 UG/1
2 SPRAY, METERED NASAL DAILY
Qty: 16.5 G | Refills: 6 | Status: SHIPPED | OUTPATIENT
Start: 2025-06-18

## 2025-06-18 RX ORDER — HYDROCODONE BITARTRATE AND ACETAMINOPHEN 10; 325 MG/1; MG/1
1 TABLET ORAL 2 TIMES DAILY
Qty: 60 TABLET | Refills: 0 | Status: SHIPPED | OUTPATIENT
Start: 2025-06-18

## 2025-06-18 RX ORDER — GABAPENTIN 300 MG/1
300 CAPSULE ORAL EVERY EVENING
Qty: 90 CAPSULE | Refills: 0 | Status: SHIPPED | OUTPATIENT
Start: 2025-06-18

## 2025-06-18 RX ORDER — IBUPROFEN 800 MG/1
800 TABLET, FILM COATED ORAL 3 TIMES DAILY PRN
Qty: 90 TABLET | Refills: 1 | Status: SHIPPED | OUTPATIENT
Start: 2025-06-18

## 2025-07-06 ENCOUNTER — TELEMEDICINE (OUTPATIENT)
Dept: FAMILY MEDICINE CLINIC | Facility: TELEHEALTH | Age: 55
End: 2025-07-06
Payer: MEDICAID

## 2025-07-06 DIAGNOSIS — J01.40 ACUTE PANSINUSITIS, RECURRENCE NOT SPECIFIED: Primary | ICD-10-CM

## 2025-07-06 RX ORDER — PREDNISONE 10 MG/1
TABLET ORAL
Qty: 21 TABLET | Refills: 0 | Status: SHIPPED | OUTPATIENT
Start: 2025-07-06

## 2025-07-06 NOTE — PROGRESS NOTES
You have chosen to receive care through a telehealth visit.  Do you consent to use a video/audio connection for your medical care today? Yes     Patient or patient representative verbalized consent for the use of Ambient Listening during the visit with  DONNA Cowart for chart documentation. 7/6/2025  09:47 EDT    CHIEF COMPLAINT  No chief complaint on file.        HPI  History of Present Illness  The patient is a 54-year-old female who presents via virtual visit with complaints of sinus symptoms.    She reports experiencing recurrent sinus issues, including a headache, nasal pressure, and rawness due to persistent nasal discharge. She had planned to seek medical attention on Monday but woke up today with severe nasal congestion. She has been using a sinus rinse and describes her throat as very raw. Given her history of sinus infections, she opted for a video consultation as she was unable to drive due to the congestion. She is requesting a prescription for steroids and antibiotics to be sent to her pharmacy. She also mentions that a 5-day course of Augmentin has not been effective in the past, and she would prefer a 7-day course. She has some leftover cough syrup at home and does not require a new prescription.       Review of Systems  See HPI    Past Medical History:   Diagnosis Date    Allergic 1993    Anxiety 2005    Arthritis 1999    Asthma 1993    COPD (chronic obstructive pulmonary disease) 2015    Depression 2005    Diabetes mellitus Sept 2024    GERD (gastroesophageal reflux disease) 1994    Headache 2008    Low back pain 1999    Neuromuscular disorder 2007    Obesity 2000       Family History   Problem Relation Age of Onset    Arthritis Mother     Cancer Mother     Diabetes Mother     Hearing loss Mother     Hyperlipidemia Mother     Miscarriages / Stillbirths Mother     Thyroid disease Mother     Vision loss Mother     Alcohol abuse Father     Asthma Father        Social History     Socioeconomic  History    Marital status:    Tobacco Use    Smoking status: Never     Passive exposure: Yes    Smokeless tobacco: Never    Tobacco comments:     only smoked here and there not a consistent smoker. I've quit smoking altogether for over 4 yrs   Vaping Use    Vaping status: Never Used   Substance and Sexual Activity    Alcohol use: Never    Drug use: Never    Sexual activity: Yes     Partners: Male     Birth control/protection: Tubal ligation       Nalini Nguyễn  reports that she has never smoked. She has been exposed to tobacco smoke. She has never used smokeless tobacco.             There were no vitals taken for this visit.    PHYSICAL EXAM  Physical Exam   Constitutional: She is oriented to person, place, and time. She appears well-developed and well-nourished. She does not have a sickly appearance. She does not appear ill.   HENT:   Head: Normocephalic and atraumatic.   Nose: Right sinus exhibits maxillary sinus tenderness and frontal sinus tenderness. Left sinus exhibits maxillary sinus tenderness and frontal sinus tenderness.   Pulmonary/Chest: Effort normal.  No respiratory distress.  Neurological: She is alert and oriented to person, place, and time.           Diagnoses and all orders for this visit:    1. Acute pansinusitis, recurrence not specified (Primary)  -     amoxicillin-clavulanate (AUGMENTIN) 875-125 MG per tablet; Take 1 tablet by mouth 2 (Two) Times a Day for 10 days.  Dispense: 20 tablet; Refill: 0  -     predniSONE (DELTASONE) 10 MG (21) dose pack; Use as directed on package  Dispense: 21 tablet; Refill: 0    --take medications as prescribed  --increase fluids, rest as needed, tylenol or ibuprofen for pain  --f/u in 5-7 days if no improvement      Assessment & Plan  1. Sinusitis.  - Reports recurring sinus headaches, nasal congestion, and raw throat. Rapid increase in symptoms noted.  - Physical exam findings consistent with sinus infection. Patient has history of similar infections.  -  Discussed treatment options, reviewed patient's history, and agreed on a 7-day course of antibiotics.  - Augmentin 875 mg twice daily for 7 days and a prednisone pack prescribed. Medication sent to pharmacy.         FOLLOW-UP  As discussed during visit with PCP/[facility] Care if no improvement or Urgent Care/Emergency Department if worsening of symptoms    Patient verbalizes understanding of medication dosage, comfort measures, instructions for treatment and follow-up.    Yandy Huang, APRN  07/06/2025  09:47 EDT    Mode of Visit: Video  Location of patient: -HOME-  Location of provider: +HOME+  You have chosen to receive care through a telehealth visit.  The patient has signed the video visit consent form.  The visit included audio and video interaction. No technical issues occurred during this visit.    The use of a video visit has been reviewed with the patient and verbal informed consent has been obtained. Myself and Nalini Nguyễn     participated in this visit. The patient is located in [address] [place] [address]  I am located in [place], [place]. [employer] and [email or web address] were utilized. I spent 1 minutes in the patient's chart for this visit.      Note Disclaimer: At [facility], we believe that sharing information builds trust and better   relationships. You are receiving this note because you recently visited [facility]. It is possible you   will see health information before a provider has talked with you about it. This kind of information can   be easy to misunderstand. To help you fully understand what it means for your health, we urge you to   discuss this note with your provider.

## 2025-07-16 ENCOUNTER — HOSPITAL ENCOUNTER (EMERGENCY)
Facility: HOSPITAL | Age: 55
Discharge: HOME | End: 2025-07-16
Attending: STUDENT IN AN ORGANIZED HEALTH CARE EDUCATION/TRAINING PROGRAM | Admitting: STUDENT IN AN ORGANIZED HEALTH CARE EDUCATION/TRAINING PROGRAM
Payer: MEDICAID

## 2025-07-16 VITALS
OXYGEN SATURATION: 96 % | DIASTOLIC BLOOD PRESSURE: 80 MMHG | SYSTOLIC BLOOD PRESSURE: 134 MMHG | WEIGHT: 262.35 LBS | BODY MASS INDEX: 42.16 KG/M2 | HEIGHT: 66 IN | TEMPERATURE: 98.6 F | HEART RATE: 73 BPM | RESPIRATION RATE: 18 BRPM

## 2025-07-16 DIAGNOSIS — S39.012A STRAIN OF LUMBAR REGION, INITIAL ENCOUNTER: Primary | ICD-10-CM

## 2025-07-16 PROCEDURE — 96372 THER/PROPH/DIAG INJ SC/IM: CPT

## 2025-07-16 PROCEDURE — 99283 EMERGENCY DEPT VISIT LOW MDM: CPT | Performed by: STUDENT IN AN ORGANIZED HEALTH CARE EDUCATION/TRAINING PROGRAM

## 2025-07-16 PROCEDURE — 25010000002 HYDROMORPHONE 1 MG/ML SOLUTION: Performed by: PHYSICIAN ASSISTANT

## 2025-07-16 PROCEDURE — 63710000001 ONDANSETRON ODT 4 MG TABLET DISPERSIBLE: Performed by: PHYSICIAN ASSISTANT

## 2025-07-16 RX ORDER — ONDANSETRON 4 MG/1
4 TABLET, ORALLY DISINTEGRATING ORAL ONCE
Status: COMPLETED | OUTPATIENT
Start: 2025-07-16 | End: 2025-07-16

## 2025-07-16 RX ORDER — DIAZEPAM 5 MG/1
5 TABLET ORAL EVERY 12 HOURS PRN
Qty: 6 TABLET | Refills: 0 | Status: SHIPPED | OUTPATIENT
Start: 2025-07-16

## 2025-07-16 RX ORDER — DIAZEPAM 5 MG/1
5 TABLET ORAL ONCE
Status: COMPLETED | OUTPATIENT
Start: 2025-07-16 | End: 2025-07-16

## 2025-07-16 RX ORDER — LIDOCAINE 4 G/G
1 PATCH TOPICAL
Status: DISCONTINUED | OUTPATIENT
Start: 2025-07-16 | End: 2025-07-16 | Stop reason: HOSPADM

## 2025-07-16 RX ADMIN — HYDROMORPHONE HYDROCHLORIDE 0.5 MG: 1 INJECTION, SOLUTION INTRAMUSCULAR; INTRAVENOUS; SUBCUTANEOUS at 18:33

## 2025-07-16 RX ADMIN — DIAZEPAM 5 MG: 5 TABLET ORAL at 18:29

## 2025-07-16 RX ADMIN — ONDANSETRON 4 MG: 4 TABLET, ORALLY DISINTEGRATING ORAL at 18:29

## 2025-07-16 RX ADMIN — LIDOCAINE 1 PATCH: 4 PATCH TOPICAL at 18:29

## 2025-07-17 NOTE — ED PROVIDER NOTES
Subjective   History of Present Illness  Patient is a 54-year-old female who presents the emergency room with low back pain.  Says she was standing talking with her  and she went to walk towards her left when she felt a sudden pain around the sacrum.  Says it pain shot around both sides of her lower abdomen and shot up her back at the time of the initial onset.  She denies fever, IV drug use, weakness of legs, saddle anesthesia, incontinence of bowel or bladder, recent injury, spinal injections.  She does see a pain doctor for back pain but has never had injections.      Review of Systems   Constitutional: Negative.    HENT: Negative.     Eyes: Negative.    Respiratory: Negative.     Gastrointestinal: Negative.    Genitourinary: Negative.    Musculoskeletal:  Positive for back pain.   Skin: Negative.    Neurological: Negative.    Psychiatric/Behavioral: Negative.     All other systems reviewed and are negative.      Past Medical History:   Diagnosis Date    Allergic 1993    Anxiety 2005    Arthritis 1999    Asthma 1993    COPD (chronic obstructive pulmonary disease) 2015    Depression 2005    Diabetes mellitus Sept 2024    GERD (gastroesophageal reflux disease) 1994    Headache 2008    Low back pain 1999    Neuromuscular disorder 2007    Obesity 2000       Allergies   Allergen Reactions    Aspirin Anaphylaxis    Azithromycin Anxiety     Shakiness and restless with IV form only, Oral ok       Past Surgical History:   Procedure Laterality Date    SINUS SURGERY  2020    TUBAL ABDOMINAL LIGATION  1993       Family History   Problem Relation Age of Onset    Arthritis Mother     Cancer Mother     Diabetes Mother     Hearing loss Mother     Hyperlipidemia Mother     Miscarriages / Stillbirths Mother     Thyroid disease Mother     Vision loss Mother     Alcohol abuse Father     Asthma Father        Social History     Socioeconomic History    Marital status:    Tobacco Use    Smoking status: Never     Passive  exposure: Yes    Smokeless tobacco: Never    Tobacco comments:     only smoked here and there not a consistent smoker. I've quit smoking altogether for over 4 yrs   Vaping Use    Vaping status: Never Used   Substance and Sexual Activity    Alcohol use: Never    Drug use: Never    Sexual activity: Yes     Partners: Male     Birth control/protection: Tubal ligation           Objective   Physical Exam  Vitals and nursing note reviewed.   Constitutional:       Appearance: Normal appearance.   HENT:      Head: Normocephalic.   Eyes:      Conjunctiva/sclera: Conjunctivae normal.   Cardiovascular:      Rate and Rhythm: Normal rate and regular rhythm.      Pulses: Normal pulses.      Heart sounds: Normal heart sounds.   Pulmonary:      Effort: Pulmonary effort is normal.   Musculoskeletal:         General: Tenderness (Sacrum and right SI joint) present.      Cervical back: Normal range of motion and neck supple.   Skin:     General: Skin is warm and dry.      Findings: No erythema or rash.   Neurological:      Mental Status: She is alert.   Psychiatric:         Mood and Affect: Mood normal.         Behavior: Behavior normal.         Procedures           ED Course                                                       Medical Decision Making  Patient presents emergency room with severe low back pain over the sacrum that crosses both sides.  She does feel tenderness on the sacrum and the right SI joint.  I do appreciate some knots of the muscles.  She has no neurofocal deficits.  I gave her some Dilaudid, Valium and a lidocaine patch in the ER.  She says that helped but does not completely resolve but she is comfortable as long as she is not moving.  Do not feel radiology indicated at this time as there is no trauma and she has no neurofocal deficits.  Low suspicion for cauda equina or spinal abscess.  She does she knows she has degenerative disc disease.  She sees Dr. Mesha Nicholas who writes her pain medications.  I will give  her prescription for 6 Valium so that she can take twice a day to help up with along with her pain medicines that could help.  She can also use heat or ice.  She has lidocaine patches and a lidocaine roll-on that she can also use.  She says that she will return to emergency room if she develops neurological symptoms but at this time medically cleared to follow-up with Dr. Nicholas.    -------------------------              07/16/25                    1828        -------------------------   BP:         134/80        Pulse:        73          Resp:         18          Temp:   98.6 °F (37 °C)   SpO2:         96%        -------------------------      Problems Addressed:  Strain of lumbar region, initial encounter: complicated acute illness or injury    Risk  OTC drugs.  Prescription drug management.        Final diagnoses:   Strain of lumbar region, initial encounter       ED Disposition  ED Disposition       ED Disposition   Discharge    Condition   Stable    Comment   --               Mesha Nicholas MD  [facility] [address] [address] [phone]               Medication List        New Prescriptions      diazePAM 5 MG tablet  Commonly known as: VALIUM  Take 1 tablet by mouth Every 12 (Twelve) Hours As Needed for Muscle Spasms.            Changed      hydroCHLOROthiazide 12.5 MG capsule  Commonly known as: MICROZIDE  What changed: additional instructions     Sinus Rinse pack  Use 1 time daily when sinuses are flared and bothered.  What changed:   how much to take  how to take this  when to take this  reasons to take this               Where to Get Your Medications        These medications were sent to [facility] – [place], [place] - [address] - [phone] [facility] [phone]   [address], [facility] [address]      Phone: [phone]   diazePAM 5 MG tablet            Carmen Valderrama PA-C  07/16/25 2014

## 2025-07-25 ENCOUNTER — TELEMEDICINE (OUTPATIENT)
Dept: FAMILY MEDICINE CLINIC | Facility: TELEHEALTH | Age: 55
End: 2025-07-25
Payer: MEDICAID

## 2025-07-25 DIAGNOSIS — J32.9 SINUSITIS, UNSPECIFIED CHRONICITY, UNSPECIFIED LOCATION: Primary | ICD-10-CM

## 2025-07-25 PROCEDURE — 1159F MED LIST DOCD IN RCRD: CPT | Performed by: NURSE PRACTITIONER

## 2025-07-25 PROCEDURE — 1160F RVW MEDS BY RX/DR IN RCRD: CPT | Performed by: NURSE PRACTITIONER

## 2025-07-25 PROCEDURE — 99213 OFFICE O/P EST LOW 20 MIN: CPT | Performed by: NURSE PRACTITIONER

## 2025-07-25 RX ORDER — BROMPHENIRAMINE MALEATE, PSEUDOEPHEDRINE HYDROCHLORIDE, AND DEXTROMETHORPHAN HYDROBROMIDE 2; 30; 10 MG/5ML; MG/5ML; MG/5ML
5 SYRUP ORAL 4 TIMES DAILY PRN
Qty: 20 ML | Refills: 0 | Status: SHIPPED | OUTPATIENT
Start: 2025-07-25

## 2025-07-25 RX ORDER — DOXYCYCLINE 100 MG/1
100 CAPSULE ORAL 2 TIMES DAILY
Qty: 20 CAPSULE | Refills: 0 | Status: SHIPPED | OUTPATIENT
Start: 2025-07-25 | End: 2025-08-04

## 2025-07-25 NOTE — PROGRESS NOTES
ALEISHA Nguyễn is a 54 y.o. female  presents with complaint of symptoms starting two days ago including nasal drainage with foul smell/taste, sinus pressure, headache, chest congestion, runny nose, sneezing. She was diagnosed with sinusitis two weeks ago and took augmentin and prednisone. She felt like symptoms improved mostly until this week. Denies fever, chills or sweats. She has been using flonase daily.     Review of Systems    Past Medical History:   Diagnosis Date    Allergic 1993    Anxiety 2005    Arthritis 1999    Asthma 1993    COPD (chronic obstructive pulmonary disease) 2015    Depression 2005    Diabetes mellitus Sept 2024    GERD (gastroesophageal reflux disease) 1994    Headache 2008    Low back pain 1999    Neuromuscular disorder 2007    Obesity 2000       Family History   Problem Relation Age of Onset    Arthritis Mother     Cancer Mother     Diabetes Mother     Hearing loss Mother     Hyperlipidemia Mother     Miscarriages / Stillbirths Mother     Thyroid disease Mother     Vision loss Mother     Alcohol abuse Father     Asthma Father        Social History     Socioeconomic History    Marital status:    Tobacco Use    Smoking status: Never     Passive exposure: Yes    Smokeless tobacco: Never    Tobacco comments:     only smoked here and there not a consistent smoker. I've quit smoking altogether for over 4 yrs   Vaping Use    Vaping status: Never Used   Substance and Sexual Activity    Alcohol use: Never    Drug use: Never    Sexual activity: Yes     Partners: Male     Birth control/protection: Tubal ligation         There were no vitals taken for this visit.    PHYSICAL EXAM  Physical Exam   Constitutional: She appears well-developed and well-nourished.   HENT:   Head: Normocephalic.   Nose: Rhinorrhea present. Right sinus exhibits maxillary sinus tenderness and frontal sinus tenderness. Left sinus exhibits maxillary sinus tenderness and frontal sinus tenderness.   Neck: Neck normal  appearance.  Pulmonary/Chest: Effort normal.   Neurological: She is alert.   Psychiatric: She has a normal mood and affect. Her speech is normal.       Diagnoses and all orders for this visit:    1. Sinusitis, unspecified chronicity, unspecified location (Primary)  -     doxycycline (VIBRAMYCIN) 100 MG capsule; Take 1 capsule by mouth 2 (Two) Times a Day for 10 days.  Dispense: 20 capsule; Refill: 0  -     brompheniramine-pseudoephedrine-DM 30-2-10 MG/5ML syrup; Take 5 mL by mouth 4 (Four) Times a Day As Needed for Congestion.  Dispense: 20 mL; Refill: 0          FOLLOW-UP  As discussed during visit with [facility], if symptoms worsen or fail to improve, follow-up with PCP/Urgent Care/Emergency Department.    Patient verbalizes understanding of medications, instructions for treatment and follow-up.    Gloria Rhondaalessandro Morel, APRN  07/25/2025  11:10 EDT      Mode of Visit: Video  Location of patient: -HOME-  Location of provider: Home  You have chosen to receive care through a telehealth visit.  The patient has signed the video visit consent form.  The visit included audio and video interaction. No technical issues occurred during this visit.

## 2025-08-01 ENCOUNTER — OFFICE VISIT (OUTPATIENT)
Dept: INTERNAL MEDICINE | Facility: CLINIC | Age: 55
End: 2025-08-01
Payer: MEDICAID

## 2025-08-01 VITALS
BODY MASS INDEX: 40.05 KG/M2 | SYSTOLIC BLOOD PRESSURE: 114 MMHG | TEMPERATURE: 98.6 F | OXYGEN SATURATION: 96 % | DIASTOLIC BLOOD PRESSURE: 78 MMHG | WEIGHT: 248 LBS | HEART RATE: 72 BPM

## 2025-08-01 DIAGNOSIS — E11.65 TYPE 2 DIABETES MELLITUS WITH HYPERGLYCEMIA, WITHOUT LONG-TERM CURRENT USE OF INSULIN: ICD-10-CM

## 2025-08-01 DIAGNOSIS — J33.8 OTHER POLYP OF SINUS: Primary | ICD-10-CM

## 2025-08-01 DIAGNOSIS — J44.89 ASTHMA-COPD OVERLAP SYNDROME: ICD-10-CM

## 2025-08-01 DIAGNOSIS — J32.9 CHRONIC SINUSITIS, UNSPECIFIED LOCATION: ICD-10-CM

## 2025-08-01 PROCEDURE — 1160F RVW MEDS BY RX/DR IN RCRD: CPT | Performed by: INTERNAL MEDICINE

## 2025-08-01 PROCEDURE — 1159F MED LIST DOCD IN RCRD: CPT | Performed by: INTERNAL MEDICINE

## 2025-08-01 PROCEDURE — 3051F HG A1C>EQUAL 7.0%<8.0%: CPT | Performed by: INTERNAL MEDICINE

## 2025-08-01 PROCEDURE — 99214 OFFICE O/P EST MOD 30 MIN: CPT | Performed by: INTERNAL MEDICINE

## 2025-08-01 RX ORDER — METHYLPREDNISOLONE 4 MG/1
TABLET ORAL
Qty: 21 TABLET | Refills: 0 | Status: SHIPPED | OUTPATIENT
Start: 2025-08-01

## 2025-08-01 NOTE — PROGRESS NOTES
Nalini Nguyễn is a 54 y.o. female, who presents with a chief complaint of   Chief Complaint   Patient presents with    Sinus Problem     Had a televist with another provider was given some antibiotics and has taken 2 doses so far and has not been feeling any better            Sinus Problem  Associated symptoms include sinus pressure.      Pt here for f/u.  She has had sinus issues over the past month.  She took augmentin 875 bid x 10 days and steroids the on 7/25 she was prescribed doxycycline 100mg bid.  She is on day 7/10 doxy.  No fever.  She has sometimes where she feels hot then cold. She had sinus surgery on 2019 where she had polyps removed.  She still uses sinus rinse.  The pressure is the worst in her frontal sinuses and left ear.  She has flonase and uses this daily.  She felt some better with the steroids.  She is starting to have some wheezing.  She has already had 6 rounds of antibiotics and steroids this year.  Last year she had 4 rounds of steroids/abx.     7/25 - doxy  7/6 - Augmentin/prednisone  5/19 - azithromycin/prednisone  3/21 - amoxicillin/prednisone  1/28 - doxy/prednisone  1/6 - Augmentin/prednisone    10/4/24 - azithromycin/prednisone  8/21/24- azithromycin/prednisone  5/13/24- Augmentin/prednisone  1/9/24 - azithromycin/prednisone    The following portions of the patient's history were reviewed and updated as appropriate: allergies, current medications, past family history, past medical history, past social history, past surgical history and problem list.    Allergies: Aspirin and Azithromycin    Review of Systems   Constitutional: Negative.    HENT:  Positive for sinus pressure and sinus pain.    Eyes: Negative.    Respiratory:  Positive for wheezing.    Cardiovascular: Negative.    Gastrointestinal: Negative.    Endocrine: Negative.    Genitourinary: Negative.    Musculoskeletal: Negative.    Skin: Negative.    Allergic/Immunologic: Negative.    Neurological: Negative.     Hematological: Negative.    Psychiatric/Behavioral: Negative.     All other systems reviewed and are negative.            Wt Readings from Last 3 Encounters:   08/01/25 112 kg (248 lb)   07/16/25 119 kg (262 lb 5.6 oz)   04/08/25 119 kg (263 lb 3.2 oz)     Temp Readings from Last 3 Encounters:   08/01/25 98.6 °F (37 °C) (Infrared)   07/16/25 98.6 °F (37 °C) (Oral)   04/08/25 97.7 °F (36.5 °C) (Infrared)     BP Readings from Last 3 Encounters:   08/01/25 114/78   07/16/25 134/80   04/08/25 122/88     Pulse Readings from Last 3 Encounters:   08/01/25 72   07/16/25 73   04/08/25 69     Body mass index is 40.05 kg/m².  SpO2 Readings from Last 3 Encounters:   08/01/25 96%   07/16/25 96%   04/08/25 97%          Physical Exam  Vitals and nursing note reviewed.   Constitutional:       General: She is not in acute distress.     Appearance: She is well-developed.   HENT:      Head: Normocephalic and atraumatic.      Right Ear: External ear normal.      Left Ear: External ear normal.      Nose: Nose normal.   Eyes:      Conjunctiva/sclera: Conjunctivae normal.      Pupils: Pupils are equal, round, and reactive to light.   Cardiovascular:      Rate and Rhythm: Normal rate and regular rhythm.      Heart sounds: Normal heart sounds.   Pulmonary:      Effort: Pulmonary effort is normal. No respiratory distress.      Breath sounds: Normal breath sounds. No wheezing.   Musculoskeletal:         General: Normal range of motion.      Cervical back: Normal range of motion and neck supple.      Comments: Normal gait   Skin:     General: Skin is warm and dry.   Neurological:      Mental Status: She is alert and oriented to person, place, and time.   Psychiatric:         Behavior: Behavior normal.         Thought Content: Thought content normal.         Judgment: Judgment normal.         Results for orders placed or performed in visit on 04/08/25   Comprehensive Metabolic Panel    Collection Time: 04/08/25  9:36 AM    Specimen: Blood    Result Value Ref Range    Glucose 91 65 - 99 mg/dL    BUN 9 6 - 20 mg/dL    Creatinine 0.87 0.57 - 1.00 mg/dL    EGFR Result 79.3 >60.0 mL/min/1.73    BUN/Creatinine Ratio 10.3 7.0 - 25.0    Sodium 143 136 - 145 mmol/L    Potassium 4.1 3.5 - 5.2 mmol/L    Chloride 107 98 - 107 mmol/L    Total CO2 25.9 22.0 - 29.0 mmol/L    Calcium 9.4 8.6 - 10.5 mg/dL    Total Protein 6.4 6.0 - 8.5 g/dL    Albumin 3.9 3.5 - 5.2 g/dL    Globulin 2.5 gm/dL    A/G Ratio 1.6 g/dL    Total Bilirubin 0.5 0.0 - 1.2 mg/dL    Alkaline Phosphatase 147 (H) 39 - 117 U/L    AST (SGOT) 24 1 - 32 U/L    ALT (SGPT) 23 1 - 33 U/L   TSH    Collection Time: 04/08/25  9:36 AM    Specimen: Blood   Result Value Ref Range    TSH 3.470 0.270 - 4.200 uIU/mL   CBC (No Diff)    Collection Time: 04/08/25  9:36 AM    Specimen: Blood   Result Value Ref Range    WBC 7.82 3.40 - 10.80 10*3/mm3    RBC 4.71 3.77 - 5.28 10*6/mm3    Hemoglobin 13.9 12.0 - 15.9 g/dL    Hematocrit 41.1 34.0 - 46.6 %    MCV 87.3 79.0 - 97.0 fL    MCH 29.5 26.6 - 33.0 pg    MCHC 33.8 31.5 - 35.7 g/dL    RDW 12.6 12.3 - 15.4 %    Platelets 278 140 - 450 10*3/mm3   Hemoglobin A1c    Collection Time: 04/08/25  9:36 AM    Specimen: Blood   Result Value Ref Range    Hemoglobin A1C 7.30 (H) 4.80 - 5.60 %   Lipid Panel With LDL / HDL Ratio    Collection Time: 04/08/25  9:36 AM   Result Value Ref Range    Total Cholesterol 196 0 - 200 mg/dL    Triglycerides 98 0 - 150 mg/dL    HDL Cholesterol 43 40 - 60 mg/dL    VLDL Cholesterol Sammy 18 5 - 40 mg/dL    LDL Chol Calc ([facility]) 135 (H) 0 - 100 mg/dL    LDL/HDL RATIO 3.10      Result Review :                  Assessment and Plan    Diagnoses and all orders for this visit:    1. Other polyp of sinus (Primary)  -     CT Sinus Without Contrast; Future  -     Ambulatory Referral to ENT (Otolaryngology)    2. Chronic sinusitis, unspecified location - recurrent issues with 6 rounds of steroids and abx in the past 8 months.  Needs further investigation and  work up.  -     CT Sinus Without Contrast; Future  -     Ambulatory Referral to ENT (Otolaryngology)  -     methylPREDNISolone (MEDROL) 4 MG dose pack; Take as directed on package instructions.  Dispense: 21 tablet; Refill: 0    3. Asthma-COPD overlap syndrome - pt wheezing today.  Restart albuterol q 4 hours.    -     methylPREDNISolone (MEDROL) 4 MG dose pack; Take as directed on package instructions.  Dispense: 21 tablet; Refill:     4. Type 2 diabetes mellitus with hyperglycemia, without long-term current use of insulin - last A1c 7.3 A1c higher bc of repeated steroid doses.                         Outpatient Medications Prior to Visit   Medication Sig Dispense Refill    albuterol sulfate  (90 Base) MCG/ACT inhaler Inhale 2 puffs Every 6 (Six) Hours As Needed for Wheezing or Shortness of Air. 18 g 6    Blood Glucose Monitoring Suppl (D-Care Glucometer) w/Device kit Use 1 Device 3 (Three) Times a Day As Needed (glucose checks). 1 each 6    Blood Glucose Monitoring Suppl (FreeStyle Lite) device Dispense 1 Freestyle Lite Glucometer as indicated by insurance - or brand of test strips dispensed previously 1 each 0    brompheniramine-pseudoephedrine-DM 30-2-10 MG/5ML syrup Take 5 mL by mouth 4 (Four) Times a Day As Needed for Congestion. 20 mL 0    clonazePAM (KlonoPIN) 1 MG tablet Take 1 tablet by mouth 2 (Two) Times a Day As Needed for Anxiety.      diazePAM (VALIUM) 5 MG tablet Take 1 tablet by mouth Every 12 (Twelve) Hours As Needed for Muscle Spasms. 6 tablet 0    doxycycline (VIBRAMYCIN) 100 MG capsule Take 1 capsule by mouth 2 (Two) Times a Day for 10 days. 20 capsule 0    fexofenadine (ALLEGRA) 60 MG tablet Take 3 tablets by mouth Daily for 90 days. 90 tablet 2    fluticasone (FLONASE) 50 MCG/ACT nasal spray 1 spray by Each Nare route Daily.      Fluticasone-Salmeterol (Advair Diskus) 250-50 MCG/ACT DISKUS Inhale 2 puffs Daily.      gabapentin (NEURONTIN) 300 MG capsule Take 1 capsule by mouth Every  Evening. 90 capsule 0    glucose blood test strip Use once a day for blood sugar checks. 100 each 12    hydroCHLOROthiazide (MICROZIDE) 12.5 MG capsule Take 1 capsule by mouth Daily. (Patient taking differently: Take 1 capsule by mouth Daily. sometimes)      HYDROcodone-acetaminophen (NORCO)  MG per tablet Take 1 tablet by mouth 2 (Two) Times a Day. 60 tablet 0    Hydrocortisone, Perianal, (Preparation H) 1 % cream rectal cream Insert 1 Application into the rectum 2 (Two) Times a Day As Needed (IRRITATION). 26 g 6    Hypertonic Nasal Wash (Sinus Rinse) pack Use 1 time daily when sinuses are flared and bothered. (Patient taking differently: Administer 1 Application into the nostril(s) as directed by provider Daily As Needed (CONGESTION). Use 1 time daily when sinuses are flared and bothered.) 70 each 3    ibuprofen (ADVIL,MOTRIN) 800 MG tablet Take 1 tablet by mouth 3 (Three) Times a Day As Needed for Mild Pain or Moderate Pain. 90 tablet 1    ipratropium-albuterol (DUO-NEB) 0.5-2.5 mg/3 ml nebulizer Take 3 mL by nebulization 4 (Four) Times a Day.      ketoconazole (NIZORAL) 2 % shampoo Apply  topically to the appropriate area as directed 2 (Two) Times a Week. 120 mL 6    Lancets (accu-chek safe-t pro) lancets Use once a day for blood sugar checks. 200 each 12    lidocaine (LIDODERM) 5 % Place 1 patch on the skin as directed by provider Daily. Remove & Discard patch within 12 hours or as directed by MD 30 each 1    methocarbamol (ROBAXIN) 500 MG tablet Take 1 tablet by mouth 3 (Three) Times a Day As Needed for Muscle Spasms. 90 tablet 3    Tirzepatide 5 MG/0.5ML solution auto-injector Inject 5 mg under the skin into the appropriate area as directed 1 (One) Time Per Week. 6 mL 1    topiramate (TOPAMAX) 100 MG tablet Take 1 tablet by mouth Daily. 90 tablet 2    triamcinolone (KENALOG) 0.5 % ointment Apply 1 Application topically to the appropriate area as directed 2 (Two) Times a Day.      Triamcinolone Acetonide  (NASACORT) 55 MCG/ACT nasal inhaler Administer 2 sprays into the nostril(s) as directed by provider Daily. 16.5 g 6    Umeclidinium-Vilanterol (ANORO ELLIPTA) 62.5-25 MCG/ACT aerosol powder  inhaler Inhale 1 puff Daily. 60 each 3    vitamin D (ERGOCALCIFEROL) 1.25 MG (94329 UT) capsule capsule Take 1 capsule by mouth Every 7 (Seven) Days. 12 capsule 0    azithromycin (Zithromax Z-Kirit) 250 MG tablet Take 2 tablets by mouth on day 1, then 1 tablet daily on days 2-5 (Patient not taking: Reported on 8/1/2025) 6 tablet 0    levothyroxine (SYNTHROID, LEVOTHROID) 75 MCG tablet Take 1 tablet by mouth Daily. (Patient not taking: Reported on 2/6/2025)      predniSONE (DELTASONE) 10 MG (21) dose pack Use as directed on package (Patient not taking: Reported on 8/1/2025) 21 tablet 0     No facility-administered medications prior to visit.     New Medications Ordered This Visit   Medications    methylPREDNISolone (MEDROL) 4 MG dose pack     Sig: Take as directed on package instructions.     Dispense:  21 tablet     Refill:  0     [unfilled]  Medications Discontinued During This Encounter   Medication Reason    predniSONE (DELTASONE) 10 MG (21) dose pack          No follow-ups on file.    Patient was given instructions and counseling regarding her condition or for health maintenance advice. Please see specific information pulled into the AVS if appropriate.

## 2025-08-26 DIAGNOSIS — E11.65 TYPE 2 DIABETES MELLITUS WITH HYPERGLYCEMIA, WITHOUT LONG-TERM CURRENT USE OF INSULIN: ICD-10-CM
